# Patient Record
Sex: MALE | Race: WHITE | NOT HISPANIC OR LATINO | Employment: OTHER | URBAN - NONMETROPOLITAN AREA
[De-identification: names, ages, dates, MRNs, and addresses within clinical notes are randomized per-mention and may not be internally consistent; named-entity substitution may affect disease eponyms.]

---

## 2017-01-06 ENCOUNTER — TELEPHONE (OUTPATIENT)
Dept: UROLOGY | Facility: CLINIC | Age: 62
End: 2017-01-06

## 2017-01-06 NOTE — TELEPHONE ENCOUNTER
----- Message from Eleonora Chan sent at 1/6/2017  9:55 AM CST -----  Contact: [phone]  PATIENT WANTS TO KNOW RESULTS OF HIS LABS FROM December THANKS

## 2017-05-11 ENCOUNTER — OFFICE VISIT (OUTPATIENT)
Dept: CARDIOLOGY | Facility: CLINIC | Age: 62
End: 2017-05-11

## 2017-05-11 VITALS
BODY MASS INDEX: 41.77 KG/M2 | HEIGHT: 69 IN | RESPIRATION RATE: 18 BRPM | DIASTOLIC BLOOD PRESSURE: 80 MMHG | SYSTOLIC BLOOD PRESSURE: 135 MMHG | WEIGHT: 282 LBS | HEART RATE: 66 BPM

## 2017-05-11 DIAGNOSIS — I25.10 CORONARY ARTERY DISEASE INVOLVING NATIVE CORONARY ARTERY OF NATIVE HEART WITHOUT ANGINA PECTORIS: Primary | ICD-10-CM

## 2017-05-11 DIAGNOSIS — I10 ESSENTIAL HYPERTENSION: ICD-10-CM

## 2017-05-11 DIAGNOSIS — E78.2 MIXED HYPERLIPIDEMIA: ICD-10-CM

## 2017-05-11 PROCEDURE — 93000 ELECTROCARDIOGRAM COMPLETE: CPT | Performed by: PHYSICIAN ASSISTANT

## 2017-05-11 PROCEDURE — 99214 OFFICE O/P EST MOD 30 MIN: CPT | Performed by: PHYSICIAN ASSISTANT

## 2017-05-11 RX ORDER — ATORVASTATIN CALCIUM 20 MG/1
20 TABLET, FILM COATED ORAL NIGHTLY
Qty: 90 TABLET | Refills: 3 | Status: SHIPPED | OUTPATIENT
Start: 2017-05-11 | End: 2017-07-05 | Stop reason: SDUPTHER

## 2017-05-11 RX ORDER — FUROSEMIDE 20 MG/1
20 TABLET ORAL DAILY
Qty: 90 TABLET | Refills: 3 | Status: SHIPPED | OUTPATIENT
Start: 2017-05-11 | End: 2018-03-26 | Stop reason: SDUPTHER

## 2017-05-11 RX ORDER — POTASSIUM CHLORIDE 750 MG/1
10 TABLET, EXTENDED RELEASE ORAL DAILY
Qty: 90 TABLET | Refills: 3 | Status: SHIPPED | OUTPATIENT
Start: 2017-05-11 | End: 2018-03-26 | Stop reason: SDUPTHER

## 2017-05-11 RX ORDER — ASPIRIN 81 MG/1
81 TABLET ORAL DAILY
COMMUNITY

## 2017-05-11 RX ORDER — CARVEDILOL 25 MG/1
25 TABLET ORAL 2 TIMES DAILY WITH MEALS
COMMUNITY

## 2017-05-11 RX ORDER — AMLODIPINE BESYLATE 5 MG/1
5 TABLET ORAL DAILY
COMMUNITY

## 2017-05-11 RX ORDER — CHLORAL HYDRATE 500 MG
CAPSULE ORAL 2 TIMES DAILY WITH MEALS
COMMUNITY

## 2017-06-06 ENCOUNTER — HOSPITAL ENCOUNTER (OUTPATIENT)
Dept: GENERAL RADIOLOGY | Age: 62
Discharge: HOME | End: 2017-06-06
Payer: COMMERCIAL

## 2017-06-06 ENCOUNTER — HOSPITAL ENCOUNTER (OUTPATIENT)
Age: 62
Setting detail: OUTPATIENT SURGERY
Discharge: HOME | End: 2017-06-06
Attending: PHYSICAL MEDICINE & REHABILITATION | Admitting: PHYSICAL MEDICINE & REHABILITATION

## 2017-06-06 VITALS
OXYGEN SATURATION: 95 % | SYSTOLIC BLOOD PRESSURE: 123 MMHG | BODY MASS INDEX: 33.64 KG/M2 | HEIGHT: 70 IN | WEIGHT: 235 LBS | DIASTOLIC BLOOD PRESSURE: 74 MMHG | RESPIRATION RATE: 18 BRPM | HEART RATE: 49 BPM

## 2017-06-06 DIAGNOSIS — R52 PAIN: ICD-10-CM

## 2017-06-06 PROCEDURE — G8907 PT DOC NO EVENTS ON DISCHARG: HCPCS | Performed by: NURSE PRACTITIONER

## 2017-06-06 PROCEDURE — G8918 PT W/O PREOP ORDER IV AB PRO: HCPCS | Performed by: NURSE PRACTITIONER

## 2017-06-06 PROCEDURE — 62322 NJX INTERLAMINAR LMBR/SAC: CPT

## 2017-06-06 PROCEDURE — 3209999900 FLUORO FOR SURGICAL PROCEDURES

## 2017-06-06 RX ORDER — CHLORAL HYDRATE 500 MG
1000 CAPSULE ORAL 2 TIMES DAILY
COMMUNITY

## 2017-06-06 RX ORDER — M-VIT,TX,IRON,MINS/CALC/FOLIC 27MG-0.4MG
1 TABLET ORAL DAILY
COMMUNITY

## 2017-06-06 RX ORDER — METHYLPREDNISOLONE 4 MG/1
2 TABLET ORAL DAILY
COMMUNITY
End: 2018-02-27

## 2017-06-06 RX ORDER — CALCIUM POLYCARBOPHIL 625 MG 625 MG/1
625 TABLET ORAL DAILY
COMMUNITY
End: 2018-02-27

## 2017-06-06 RX ORDER — 0.9 % SODIUM CHLORIDE 0.9 %
VIAL (ML) INJECTION PRN
Status: DISCONTINUED | OUTPATIENT
Start: 2017-06-06 | End: 2017-06-06 | Stop reason: HOSPADM

## 2017-06-06 RX ORDER — AMOXICILLIN 250 MG
1 CAPSULE ORAL DAILY
COMMUNITY
End: 2018-02-27

## 2017-06-06 RX ORDER — CARVEDILOL 25 MG/1
25 TABLET ORAL 2 TIMES DAILY WITH MEALS
COMMUNITY

## 2017-06-06 RX ORDER — METHYLPREDNISOLONE ACETATE 80 MG/ML
INJECTION, SUSPENSION INTRA-ARTICULAR; INTRALESIONAL; INTRAMUSCULAR; SOFT TISSUE PRN
Status: DISCONTINUED | OUTPATIENT
Start: 2017-06-06 | End: 2017-06-06 | Stop reason: HOSPADM

## 2017-06-06 RX ORDER — ATORVASTATIN CALCIUM 20 MG/1
20 TABLET, FILM COATED ORAL NIGHTLY
COMMUNITY

## 2017-06-06 RX ORDER — TAMSULOSIN HYDROCHLORIDE 0.4 MG/1
0.4 CAPSULE ORAL NIGHTLY
COMMUNITY

## 2017-06-06 RX ORDER — AMLODIPINE BESYLATE 5 MG/1
5 TABLET ORAL DAILY
COMMUNITY

## 2017-06-06 RX ORDER — LISINOPRIL 20 MG/1
20 TABLET ORAL DAILY
COMMUNITY
End: 2018-02-27

## 2017-06-06 RX ORDER — DICLOFENAC POTASSIUM 50 MG/1
75 TABLET, FILM COATED ORAL 2 TIMES DAILY
Status: ON HOLD | COMMUNITY
End: 2018-03-14 | Stop reason: HOSPADM

## 2017-06-06 RX ORDER — LIDOCAINE HYDROCHLORIDE 10 MG/ML
INJECTION, SOLUTION INFILTRATION; PERINEURAL PRN
Status: DISCONTINUED | OUTPATIENT
Start: 2017-06-06 | End: 2017-06-06 | Stop reason: HOSPADM

## 2017-07-05 RX ORDER — ATORVASTATIN CALCIUM 20 MG/1
TABLET, FILM COATED ORAL
Qty: 90 TABLET | Refills: 3 | Status: SHIPPED | OUTPATIENT
Start: 2017-07-05 | End: 2018-03-26 | Stop reason: SDUPTHER

## 2017-09-05 ENCOUNTER — HOSPITAL ENCOUNTER (OUTPATIENT)
Age: 62
Setting detail: OUTPATIENT SURGERY
Discharge: HOME | End: 2017-09-05
Attending: PHYSICAL MEDICINE & REHABILITATION | Admitting: PHYSICAL MEDICINE & REHABILITATION

## 2017-09-05 ENCOUNTER — HOSPITAL ENCOUNTER (OUTPATIENT)
Dept: GENERAL RADIOLOGY | Age: 62
Discharge: HOME | End: 2017-09-05
Payer: COMMERCIAL

## 2017-09-05 VITALS
WEIGHT: 280 LBS | RESPIRATION RATE: 18 BRPM | HEIGHT: 70 IN | TEMPERATURE: 98 F | SYSTOLIC BLOOD PRESSURE: 130 MMHG | HEART RATE: 54 BPM | DIASTOLIC BLOOD PRESSURE: 68 MMHG | OXYGEN SATURATION: 96 % | BODY MASS INDEX: 40.09 KG/M2

## 2017-09-05 DIAGNOSIS — R52 PAIN: ICD-10-CM

## 2017-09-05 PROCEDURE — 3209999900 FLUORO FOR SURGICAL PROCEDURES

## 2017-09-05 PROCEDURE — G8918 PT W/O PREOP ORDER IV AB PRO: HCPCS | Performed by: NURSE PRACTITIONER

## 2017-09-05 PROCEDURE — G8907 PT DOC NO EVENTS ON DISCHARG: HCPCS | Performed by: NURSE PRACTITIONER

## 2017-09-05 PROCEDURE — 62323 NJX INTERLAMINAR LMBR/SAC: CPT

## 2017-09-05 RX ORDER — GABAPENTIN 300 MG/1
300 CAPSULE ORAL NIGHTLY
COMMUNITY
End: 2018-02-27

## 2017-09-05 RX ORDER — 0.9 % SODIUM CHLORIDE 0.9 %
VIAL (ML) INJECTION PRN
Status: DISCONTINUED | OUTPATIENT
Start: 2017-09-05 | End: 2017-09-05 | Stop reason: HOSPADM

## 2017-09-05 RX ORDER — METHYLPREDNISOLONE ACETATE 80 MG/ML
INJECTION, SUSPENSION INTRA-ARTICULAR; INTRALESIONAL; INTRAMUSCULAR; SOFT TISSUE PRN
Status: DISCONTINUED | OUTPATIENT
Start: 2017-09-05 | End: 2017-09-05 | Stop reason: HOSPADM

## 2017-09-05 RX ORDER — LIDOCAINE HYDROCHLORIDE 10 MG/ML
INJECTION, SOLUTION INFILTRATION; PERINEURAL PRN
Status: DISCONTINUED | OUTPATIENT
Start: 2017-09-05 | End: 2017-09-05 | Stop reason: HOSPADM

## 2017-12-01 DIAGNOSIS — N40.1 BPH WITH URINARY OBSTRUCTION: ICD-10-CM

## 2017-12-01 DIAGNOSIS — N13.8 BPH WITH URINARY OBSTRUCTION: ICD-10-CM

## 2017-12-01 DIAGNOSIS — R33.9 RETENTION OF URINE: Primary | ICD-10-CM

## 2017-12-06 ENCOUNTER — OFFICE VISIT (OUTPATIENT)
Dept: UROLOGY | Facility: CLINIC | Age: 62
End: 2017-12-06

## 2017-12-06 VITALS — TEMPERATURE: 98 F | BODY MASS INDEX: 38.25 KG/M2 | WEIGHT: 267.2 LBS | HEIGHT: 70 IN

## 2017-12-06 DIAGNOSIS — N52.01 ERECTILE DYSFUNCTION DUE TO ARTERIAL INSUFFICIENCY: ICD-10-CM

## 2017-12-06 DIAGNOSIS — N13.8 BPH WITH URINARY OBSTRUCTION: Primary | ICD-10-CM

## 2017-12-06 DIAGNOSIS — N40.1 BPH WITH URINARY OBSTRUCTION: Primary | ICD-10-CM

## 2017-12-06 DIAGNOSIS — R33.9 RETENTION OF URINE: ICD-10-CM

## 2017-12-06 LAB
BILIRUB BLD-MCNC: NEGATIVE MG/DL
CLARITY, POC: CLEAR
COLOR UR: YELLOW
GLUCOSE UR STRIP-MCNC: NEGATIVE MG/DL
KETONES UR QL: NEGATIVE
LEUKOCYTE EST, POC: NEGATIVE
NITRITE UR-MCNC: NEGATIVE MG/ML
PH UR: 5 [PH] (ref 5–8)
PROT UR STRIP-MCNC: NEGATIVE MG/DL
RBC # UR STRIP: ABNORMAL /UL
SP GR UR: 1.02 (ref 1–1.03)
UROBILINOGEN UR QL: NORMAL

## 2017-12-06 PROCEDURE — 81003 URINALYSIS AUTO W/O SCOPE: CPT | Performed by: UROLOGY

## 2017-12-06 PROCEDURE — 99213 OFFICE O/P EST LOW 20 MIN: CPT | Performed by: UROLOGY

## 2017-12-06 RX ORDER — TAMSULOSIN HYDROCHLORIDE 0.4 MG/1
1 CAPSULE ORAL NIGHTLY
Qty: 90 CAPSULE | Refills: 3 | Status: SHIPPED | OUTPATIENT
Start: 2017-12-06 | End: 2018-12-10 | Stop reason: SDUPTHER

## 2017-12-06 RX ORDER — METFORMIN HYDROCHLORIDE 500 MG/1
500 TABLET, EXTENDED RELEASE ORAL
COMMUNITY
End: 2021-03-08

## 2017-12-06 RX ORDER — MAGNESIUM GLUCONATE 27 MG(500)
500 TABLET ORAL 2 TIMES DAILY
COMMUNITY

## 2017-12-06 NOTE — PROGRESS NOTES
Subjective    Mr. Newman is 62 y.o. male    Chief Complaint: Urinary Retention/BPH    History of Present Illness       Urinary Retention  Patient complains of urinary retention. Onset of retention was 4 months ago and was sudden in onset. Patient currently does not have a urinary catheter in place. 1600 ml of urine were drained when catheter was placed. Prior to this event voiding symptoms consisted of slow stream. Prior treatments include watchful waiting. Recent medications that may have affected his voiding include none.      Benign Prostatic Hypertrophy  Patient complains of lower urinary tract symptoms. He reports frequency, incomplete emptying and weak stream. He denies nocturia one time a night and straining. Patient states symptoms are of mild severity. Onset of symptoms was 1 years ago and was sudden in onset. His AUA Symptom Score is, 10/35. He has no personal history of prostate cancer. He reports a history of no complicating symptoms. He denies flank pain, gross hematuria, kidney stones and recurrent UTI.      Erectile Dysfunction  Patient complains of erectile dysfunction. Onset of dysfunction was several years ago and was gradual in onset. Patient states the nature of difficulty is maintaining erection. Full erections occur never. Partial erections occur never. Libido is not affected. Risk factors for ED include cardiovascular disease. Patient denies history of cardiovascular disease. Previous treatment of ED includes none.  The following portions of the patient's history were reviewed and updated as appropriate: allergies, current medications, past family history, past medical history, past social history, past surgical history and problem list.    Review of Systems   Constitutional: Negative for chills and fever.   Gastrointestinal: Negative for abdominal pain, anal bleeding and blood in stool.   Genitourinary: Negative for flank pain and hematuria.         Current Outpatient Prescriptions:   •   amLODIPine (NORVASC) 10 MG tablet, Take 10 mg by mouth Daily., Disp: , Rfl:   •  aspirin 81 MG EC tablet, Take 81 mg by mouth Daily., Disp: , Rfl:   •  atorvastatin (LIPITOR) 20 MG tablet, TAKE 1 TABLET AT BEDTIME, Disp: 90 tablet, Rfl: 3  •  carvedilol (COREG) 25 MG tablet, Take 25 mg by mouth 2 (Two) Times a Day With Meals., Disp: , Rfl:   •  DICLOFENAC PO, Take 75 mg by mouth., Disp: , Rfl:   •  furosemide (LASIX) 20 MG tablet, Take 1 tablet by mouth Daily., Disp: 90 tablet, Rfl: 3  •  lisinopril-hydrochlorothiazide (PRINZIDE,ZESTORETIC) 20-25 MG per tablet, , Disp: , Rfl:   •  magnesium gluconate (MAGONATE) 500 MG tablet, Take 500 mg by mouth 2 (Two) Times a Day., Disp: , Rfl:   •  metFORMIN ER (GLUCOPHAGE-XR) 500 MG 24 hr tablet, Take 500 mg by mouth Daily With Breakfast., Disp: , Rfl:   •  Multiple Vitamin (MULTI-VITAMIN DAILY PO), Take  by mouth., Disp: , Rfl:   •  Omega-3 Fatty Acids (FISH OIL) 1000 MG capsule capsule, Take  by mouth 2 (Two) Times a Day With Meals., Disp: , Rfl:   •  potassium chloride (K-DUR,KLOR-CON) 10 MEQ CR tablet, Take 1 tablet by mouth Daily., Disp: 90 tablet, Rfl: 3  •  tamsulosin (FLOMAX) 0.4 MG capsule 24 hr capsule, Take 1 capsule by mouth every night., Disp: 30 capsule, Rfl: 11    Past Medical History:   Diagnosis Date   • CAD (coronary artery disease)    • Hyperchloremia    • Hyperlipidemia    • Hypertension    • Peripheral vascular disease    • Retention, urine    • S/P coronary artery stent placement     right side       Past Surgical History:   Procedure Laterality Date   • ANKLE ARTHROPLASTY     • FOOT SURGERY Left    • HERNIA REPAIR     • REPLACEMENT TOTAL KNEE         Social History     Social History   • Marital status:      Spouse name: N/A   • Number of children: N/A   • Years of education: N/A     Social History Main Topics   • Smoking status: Never Smoker   • Smokeless tobacco: Never Used   • Alcohol use No   • Drug use: No   • Sexual activity: Defer     Other  "Topics Concern   • None     Social History Narrative       Family History   Problem Relation Age of Onset   • Hypertension Father    • Cancer Father    • Cancer Mother    • Hypertension Mother    • Immunodeficiency Sister        Objective    Temp 98 °F (36.7 °C)  Ht 177.8 cm (70\")  Wt 121 kg (267 lb 3.2 oz)  BMI 38.34 kg/m2    Physical Exam   Constitutional: He is oriented to person, place, and time. He appears well-developed and well-nourished.   Pulmonary/Chest: Effort normal.   Abdominal: Soft. He exhibits no distension and no mass. There is no tenderness. There is no rebound and no guarding. No hernia.   Genitourinary: Penis normal. Rectal exam shows no mass, no tenderness and anal tone normal. Enlarged: for the age of the patient. Right testis shows no mass, no swelling and no tenderness. Left testis shows no mass, no swelling and no tenderness. No hypospadias. No discharge found.   Genitourinary Comments:  The urethral meatus normal in position without evidence of stricture. Epididymis without mass or tenderness. Vas Deferens is palpably normal.Anus and perineum without mass or tenderness. The prostate is approximately 50 ml. It is Symmetric, with a Soft consistency. There are no nodules present. . The seminal vesicles are Not palpable due to the size of the prostate.     Neurological: He is alert and oriented to person, place, and time.   Vitals reviewed.          Results for orders placed or performed in visit on 12/06/17   POC Urinalysis Dipstick, Automated   Result Value Ref Range    Color Yellow Yellow, Straw, Dark Yellow, Araceli    Clarity, UA Clear Clear    Glucose, UA Negative Negative, 1000 mg/dL (3+) mg/dL    Bilirubin Negative Negative    Ketones, UA Negative Negative    Specific Gravity  1.020 1.005 - 1.030    Blood, UA Trace (A) Negative    pH, Urine 5.0 5.0 - 8.0    Protein, POC Negative Negative mg/dL    Urobilinogen, UA Normal Normal    Leukocytes Negative Negative    Nitrite, UA Negative " Negative     Assessment and Plan    Diagnoses and all orders for this visit:    BPH with urinary obstruction  -     POC Urinalysis Dipstick, Automated  -     PSA; Future    Erectile dysfunction due to arterial insufficiency    Retention of urine      PSA stable at 2.2.  His voiding symptoms are stable we will continue Flomax.  Follow-up in 1 year.

## 2018-02-26 ENCOUNTER — OFFICE VISIT (OUTPATIENT)
Dept: CARDIOLOGY | Facility: CLINIC | Age: 63
End: 2018-02-26

## 2018-02-26 VITALS
HEART RATE: 51 BPM | WEIGHT: 255 LBS | SYSTOLIC BLOOD PRESSURE: 120 MMHG | DIASTOLIC BLOOD PRESSURE: 70 MMHG | HEIGHT: 69 IN | BODY MASS INDEX: 37.77 KG/M2 | OXYGEN SATURATION: 97 %

## 2018-02-26 DIAGNOSIS — E78.2 MIXED HYPERLIPIDEMIA: ICD-10-CM

## 2018-02-26 DIAGNOSIS — I25.10 CORONARY ARTERY DISEASE INVOLVING NATIVE CORONARY ARTERY OF NATIVE HEART WITHOUT ANGINA PECTORIS: Primary | ICD-10-CM

## 2018-02-26 DIAGNOSIS — I10 ESSENTIAL HYPERTENSION: ICD-10-CM

## 2018-02-26 PROCEDURE — 93000 ELECTROCARDIOGRAM COMPLETE: CPT | Performed by: INTERNAL MEDICINE

## 2018-02-26 PROCEDURE — 99214 OFFICE O/P EST MOD 30 MIN: CPT | Performed by: INTERNAL MEDICINE

## 2018-02-26 RX ORDER — MELATONIN
1000 DAILY
COMMUNITY
End: 2021-03-08

## 2018-02-26 NOTE — PROGRESS NOTES
Referring Provider: Yobani Aguillon MD    Reason for Follow-up Visit: CAD    Subjective .   Chief Complaint:   Chief Complaint   Patient presents with   • Follow-up     yearly  for CAD, HTN, HDL and needs surgical clearance.   • Coronary Artery Disease     pt states he has felt much better since he has lost 30 pounds.  no symptoms.   • Hypertension     runs good at home.   • Hyperlipidemia     having lab tomorrow at [facility].   • Surgical Clearance     needs clearance to have back surgery.       History of present illness:  Caron Newman is a 62 y.o. yo male with history of CAD, s/p stent placement in 2014. Denies any chest pain but needs to have back surgery.        History  Past Medical History:   Diagnosis Date   • CAD (coronary artery disease)    • Cancer     skin   • Hyperchloremia    • Hyperlipidemia    • Hypertension    • Myocardial infarction    • Peripheral vascular disease    • Retention, urine    • S/P coronary artery stent placement     right side   • Sleep apnea    ,   Past Surgical History:   Procedure Laterality Date   • ANKLE ARTHROPLASTY     • CARDIAC CATHETERIZATION     • CORONARY STENT PLACEMENT     • FOOT SURGERY Left    • HERNIA REPAIR     • REPLACEMENT TOTAL KNEE     ,   Family History   Problem Relation Age of Onset   • Hypertension Father    • Cancer Father    • Cancer Mother    • Hypertension Mother    • Immunodeficiency Sister    • Hypertension Brother    ,   Social History   Substance Use Topics   • Smoking status: Never Smoker   • Smokeless tobacco: Never Used   • Alcohol use No   ,     Medications  Current Outpatient Prescriptions   Medication Sig Dispense Refill   • 5-HYDROXYTRYPTOPHAN PO Take  by mouth.     • amLODIPine (NORVASC) 10 MG tablet Take 10 mg by mouth Daily.     • aspirin 81 MG EC tablet Take 81 mg by mouth Daily.     • atorvastatin (LIPITOR) 20 MG tablet TAKE 1 TABLET AT BEDTIME 90 tablet 3   • carvedilol (COREG) 25 MG tablet Take 25 mg by mouth 2 (Two) Times a Day With  "Meals.     • cholecalciferol (VITAMIN D3) 1000 units tablet Take 1,000 Units by mouth Daily.     • CHROMIUM PO Take  by mouth.     • DICLOFENAC PO Take 75 mg by mouth.     • furosemide (LASIX) 20 MG tablet Take 1 tablet by mouth Daily. 90 tablet 3   • IODINE-VITAMIN A PO Take  by mouth.     • lisinopril-hydrochlorothiazide (PRINZIDE,ZESTORETIC) 20-25 MG per tablet      • magnesium gluconate (MAGONATE) 500 MG tablet Take 500 mg by mouth 2 (Two) Times a Day.     • metFORMIN ER (GLUCOPHAGE-XR) 500 MG 24 hr tablet Take 500 mg by mouth Daily With Breakfast.     • Multiple Vitamin (MULTI-VITAMIN DAILY PO) Take  by mouth.     • Omega-3 Fatty Acids (FISH OIL) 1000 MG capsule capsule Take  by mouth 2 (Two) Times a Day With Meals.     • potassium chloride (K-DUR,KLOR-CON) 10 MEQ CR tablet Take 1 tablet by mouth Daily. 90 tablet 3   • tamsulosin (FLOMAX) 0.4 MG capsule 24 hr capsule Take 1 capsule by mouth Every Night. 90 capsule 3     No current facility-administered medications for this visit.        Allergies:  Ciprofloxacin    Review of Systems  Review of Systems   HENT: Negative for nosebleeds.    Cardiovascular: Negative for chest pain, claudication, dyspnea on exertion, irregular heartbeat, leg swelling, near-syncope, orthopnea, palpitations, paroxysmal nocturnal dyspnea and syncope.   Respiratory: Negative for cough, hemoptysis and shortness of breath.    Musculoskeletal: Positive for back pain.   Gastrointestinal: Negative for dysphagia, hematemesis and melena.   Genitourinary: Negative for hematuria.   All other systems reviewed and are negative.      Objective     Physical Exam:  /70 (BP Location: Left arm, Patient Position: Sitting, Cuff Size: Adult)  Pulse 51  Ht 175.3 cm (69\")  Wt 116 kg (255 lb)  SpO2 97%  BMI 37.66 kg/m2  Physical Exam   Constitutional: He is oriented to person, place, and time. He appears well-nourished. No distress.   HENT:   Head: Normocephalic.   Eyes: No scleral icterus.   Neck: " Normal range of motion. Neck supple.   Cardiovascular: Normal rate, regular rhythm and normal heart sounds.  Exam reveals no gallop and no friction rub.    No murmur heard.  Pulmonary/Chest: Effort normal and breath sounds normal. No respiratory distress. He has no wheezes. He has no rales.   Abdominal: Soft. Bowel sounds are normal. He exhibits no distension. There is no tenderness.   Musculoskeletal: He exhibits no edema.   Neurological: He is alert and oriented to person, place, and time.   Skin: Skin is warm and dry. He is not diaphoretic. No erythema.   Psychiatric: He has a normal mood and affect. His behavior is normal.       Results Review:    ECG 12 Lead  Date/Time: 2/26/2018 10:27 AM  Performed by: JASMYNE AVILES  Authorized by: JASMYNE AVILES   Comparison: compared with previous ECG   Similar to previous ECG  Rhythm: sinus rhythm  Rate: normal  Conduction: conduction normal  ST Segments: ST segments normal  T Waves: T waves normal  QRS axis: normal  Clinical impression: normal ECG            Office Visit on 12/06/2017   Component Date Value Ref Range Status   • Color 12/06/2017 Yellow  Yellow, Straw, Dark Yellow, Araceli Final   • Clarity, UA 12/06/2017 Clear  Clear Final   • Glucose, UA 12/06/2017 Negative  Negative, 1000 mg/dL (3+) mg/dL Final   • Bilirubin 12/06/2017 Negative  Negative Final   • Ketones, UA 12/06/2017 Negative  Negative Final   • Specific Gravity  12/06/2017 1.020  1.005 - 1.030 Final   • Blood, UA 12/06/2017 Trace* Negative Final   • pH, Urine 12/06/2017 5.0  5.0 - 8.0 Final   • Protein, POC 12/06/2017 Negative  Negative mg/dL Final   • Urobilinogen, UA 12/06/2017 Normal  Normal Final   • Leukocytes 12/06/2017 Negative  Negative Final   • Nitrite, UA 12/06/2017 Negative  Negative Final       Assessment/Plan   Caron was seen today for follow-up, coronary artery disease, hypertension, hyperlipidemia and surgical clearance.    Diagnoses and all orders for this visit:    Coronary artery  disease involving native coronary artery of native heart without angina pectoris, The patient denies chest pain, shortness of breath, dyspnea on exertion, orthopnea, PND, edema. There is no evidence of ongoing ischemia. Will need to have stress testing for preop clearance    Essential hypertension, good control    Mixed hyperlipidemia, Patient's statin therapy is followed by PCP.

## 2018-02-27 ENCOUNTER — HOSPITAL ENCOUNTER (OUTPATIENT)
Dept: PREADMISSION TESTING | Age: 63
Discharge: HOME | End: 2018-03-03
Payer: COMMERCIAL

## 2018-02-27 ENCOUNTER — HOSPITAL ENCOUNTER (OUTPATIENT)
Dept: GENERAL RADIOLOGY | Age: 63
Discharge: HOME | End: 2018-02-27
Payer: COMMERCIAL

## 2018-02-27 VITALS — HEIGHT: 69 IN | BODY MASS INDEX: 37.03 KG/M2 | WEIGHT: 250 LBS

## 2018-02-27 LAB
ALBUMIN SERPL-MCNC: 4.3 G/DL (ref 3.5–5.2)
ALP BLD-CCNC: 72 U/L (ref 40–130)
ALT SERPL-CCNC: 26 U/L (ref 5–41)
ANION GAP SERPL CALCULATED.3IONS-SCNC: 15 MMOL/L (ref 7–19)
APTT: 29.8 SEC (ref 26–36.2)
AST SERPL-CCNC: 27 U/L (ref 5–40)
BASOPHILS ABSOLUTE: 0 K/UL (ref 0–0.2)
BASOPHILS RELATIVE PERCENT: 0.5 % (ref 0–1)
BILIRUB SERPL-MCNC: 0.5 MG/DL (ref 0.2–1.2)
BILIRUBIN URINE: NEGATIVE
BLOOD, URINE: NEGATIVE
BUN BLDV-MCNC: 35 MG/DL (ref 8–23)
CALCIUM SERPL-MCNC: 9.1 MG/DL (ref 8.8–10.2)
CHLORIDE BLD-SCNC: 98 MMOL/L (ref 98–111)
CLARITY: CLEAR
CO2: 29 MMOL/L (ref 22–29)
COLOR: YELLOW
CREAT SERPL-MCNC: 1.2 MG/DL (ref 0.5–1.2)
EOSINOPHILS ABSOLUTE: 0.1 K/UL (ref 0–0.6)
EOSINOPHILS RELATIVE PERCENT: 2.5 % (ref 0–5)
GFR NON-AFRICAN AMERICAN: >60
GLUCOSE BLD-MCNC: 97 MG/DL (ref 74–109)
GLUCOSE URINE: NEGATIVE MG/DL
HCT VFR BLD CALC: 43.2 % (ref 42–52)
HEMOGLOBIN: 14.3 G/DL (ref 14–18)
INR BLD: 1.12 (ref 0.88–1.18)
KETONES, URINE: NEGATIVE MG/DL
LEUKOCYTE ESTERASE, URINE: NEGATIVE
LYMPHOCYTES ABSOLUTE: 1 K/UL (ref 1.1–4.5)
LYMPHOCYTES RELATIVE PERCENT: 22 % (ref 20–40)
MCH RBC QN AUTO: 30.6 PG (ref 27–31)
MCHC RBC AUTO-ENTMCNC: 33.1 G/DL (ref 33–37)
MCV RBC AUTO: 92.3 FL (ref 80–94)
MONOCYTES ABSOLUTE: 0.5 K/UL (ref 0–0.9)
MONOCYTES RELATIVE PERCENT: 12.5 % (ref 0–10)
NEUTROPHILS ABSOLUTE: 2.7 K/UL (ref 1.5–7.5)
NEUTROPHILS RELATIVE PERCENT: 62 % (ref 50–65)
NITRITE, URINE: NEGATIVE
PDW BLD-RTO: 13.1 % (ref 11.5–14.5)
PH UA: 5.5
PLATELET # BLD: 181 K/UL (ref 130–400)
PMV BLD AUTO: 9.4 FL (ref 9.4–12.4)
POTASSIUM SERPL-SCNC: 3.9 MMOL/L (ref 3.5–5)
PROTEIN UA: NEGATIVE MG/DL
PROTHROMBIN TIME: 14.3 SEC (ref 12–14.6)
RBC # BLD: 4.68 M/UL (ref 4.7–6.1)
SODIUM BLD-SCNC: 142 MMOL/L (ref 136–145)
SPECIFIC GRAVITY UA: 1.01
TOTAL PROTEIN: 7.2 G/DL (ref 6.6–8.7)
URINE REFLEX TO CULTURE: NORMAL
UROBILINOGEN, URINE: 0.2 E.U./DL
WBC # BLD: 4.3 K/UL (ref 4.8–10.8)

## 2018-02-27 PROCEDURE — 85610 PROTHROMBIN TIME: CPT

## 2018-02-27 PROCEDURE — 71046 X-RAY EXAM CHEST 2 VIEWS: CPT

## 2018-02-27 PROCEDURE — 80053 COMPREHEN METABOLIC PANEL: CPT

## 2018-02-27 PROCEDURE — 85025 COMPLETE CBC W/AUTO DIFF WBC: CPT

## 2018-02-27 PROCEDURE — 85730 THROMBOPLASTIN TIME PARTIAL: CPT

## 2018-02-27 RX ORDER — LISINOPRIL AND HYDROCHLOROTHIAZIDE 25; 20 MG/1; MG/1
1 TABLET ORAL DAILY
COMMUNITY

## 2018-02-27 RX ORDER — FUROSEMIDE 20 MG/1
20 TABLET ORAL NIGHTLY
COMMUNITY

## 2018-02-27 RX ORDER — BIOTIN 1 MG
TABLET ORAL 2 TIMES DAILY
COMMUNITY

## 2018-02-27 RX ORDER — TRAMADOL HYDROCHLORIDE 50 MG/1
50 TABLET ORAL EVERY 6 HOURS PRN
COMMUNITY

## 2018-02-27 RX ORDER — DIPHENHYDRAMINE HCL 25 MG
25 TABLET ORAL NIGHTLY PRN
COMMUNITY

## 2018-02-27 RX ORDER — MAGNESIUM GLUCONATE 27 MG(500)
1000 TABLET ORAL NIGHTLY
COMMUNITY

## 2018-02-27 RX ORDER — POTASSIUM CITRATE 10 MEQ/1
10 TABLET, EXTENDED RELEASE ORAL DAILY
COMMUNITY

## 2018-02-27 RX ORDER — ACETAMINOPHEN 500 MG
1000 TABLET ORAL NIGHTLY
COMMUNITY

## 2018-03-08 ENCOUNTER — HOSPITAL ENCOUNTER (OUTPATIENT)
Dept: CARDIOLOGY | Facility: HOSPITAL | Age: 63
Discharge: HOME | End: 2018-03-08
Attending: INTERNAL MEDICINE

## 2018-03-08 ENCOUNTER — TELEPHONE (OUTPATIENT)
Dept: CARDIOLOGY | Facility: CLINIC | Age: 63
End: 2018-03-08

## 2018-03-08 VITALS
DIASTOLIC BLOOD PRESSURE: 68 MMHG | SYSTOLIC BLOOD PRESSURE: 113 MMHG | HEART RATE: 46 BPM | WEIGHT: 250 LBS | BODY MASS INDEX: 37.03 KG/M2 | HEIGHT: 69 IN

## 2018-03-08 DIAGNOSIS — I25.10 CORONARY ARTERY DISEASE INVOLVING NATIVE CORONARY ARTERY OF NATIVE HEART WITHOUT ANGINA PECTORIS: ICD-10-CM

## 2018-03-08 PROCEDURE — 0 TECHNETIUM SESTAMIBI: Performed by: INTERNAL MEDICINE

## 2018-03-08 PROCEDURE — 78452 HT MUSCLE IMAGE SPECT MULT: CPT | Performed by: INTERNAL MEDICINE

## 2018-03-08 PROCEDURE — 93018 CV STRESS TEST I&R ONLY: CPT | Performed by: INTERNAL MEDICINE

## 2018-03-08 PROCEDURE — 25010000002 REGADENOSON 0.4 MG/5ML SOLUTION: Performed by: INTERNAL MEDICINE

## 2018-03-08 PROCEDURE — A9500 TC99M SESTAMIBI: HCPCS | Performed by: INTERNAL MEDICINE

## 2018-03-08 PROCEDURE — 93017 CV STRESS TEST TRACING ONLY: CPT

## 2018-03-08 PROCEDURE — 78452 HT MUSCLE IMAGE SPECT MULT: CPT

## 2018-03-08 RX ADMIN — TECHNETIUM TC 99M SESTAMIBI 1 DOSE: 1 INJECTION INTRAVENOUS at 09:50

## 2018-03-08 RX ADMIN — TECHNETIUM TC 99M SESTAMIBI 1 DOSE: 1 INJECTION INTRAVENOUS at 09:00

## 2018-03-08 RX ADMIN — REGADENOSON 0.4 MG: 0.08 INJECTION, SOLUTION INTRAVENOUS at 09:31

## 2018-03-08 NOTE — TELEPHONE ENCOUNTER
Delaney from Dr. Owens's office called asking if pt is cleared for surgery.  Pt had the stress test today.3/8/18.  Please advise.  Bassam Dickson, CMA

## 2018-03-09 ENCOUNTER — TELEPHONE (OUTPATIENT)
Dept: CARDIOLOGY | Facility: CLINIC | Age: 63
End: 2018-03-09

## 2018-03-09 LAB
BH CV STRESS BP STAGE 1: NORMAL
BH CV STRESS COMMENTS STAGE 1: NORMAL
BH CV STRESS DOSE REGADENOSON STAGE 1: 0.4
BH CV STRESS DURATION MIN STAGE 1: 0
BH CV STRESS DURATION SEC STAGE 1: 10
BH CV STRESS HR STAGE 1: 61
BH CV STRESS PROTOCOL 1: NORMAL
BH CV STRESS RECOVERY BP: NORMAL MMHG
BH CV STRESS RECOVERY HR: 55 BPM
BH CV STRESS STAGE 1: 1
LV EF NUC BP: 75 %
MAXIMAL PREDICTED HEART RATE: 156 BPM
PERCENT MAX PREDICTED HR: 39.1 %
STRESS BASELINE BP: NORMAL MMHG
STRESS BASELINE HR: 46 BPM
STRESS PERCENT HR: 46 %
STRESS POST EXERCISE DUR SEC: 10 SEC
STRESS POST PEAK BP: NORMAL MMHG
STRESS POST PEAK HR: 61 BPM
STRESS TARGET HR: 133 BPM

## 2018-03-09 NOTE — TELEPHONE ENCOUNTER
Per Dr. Helton, patient has small area of ischemia.  If he is asymptomatic, ok to proceed with surgery.

## 2018-03-13 ENCOUNTER — APPOINTMENT (OUTPATIENT)
Dept: GENERAL RADIOLOGY | Age: 63
DRG: 455 | End: 2018-03-13
Payer: COMMERCIAL

## 2018-03-13 ENCOUNTER — ANESTHESIA EVENT (OUTPATIENT)
Dept: OPERATING ROOM | Age: 63
DRG: 455 | End: 2018-03-13
Payer: COMMERCIAL

## 2018-03-13 ENCOUNTER — HOSPITAL ENCOUNTER (INPATIENT)
Age: 63
LOS: 1 days | Discharge: HOME | DRG: 455 | End: 2018-03-14
Payer: COMMERCIAL

## 2018-03-13 ENCOUNTER — ANESTHESIA (OUTPATIENT)
Dept: OPERATING ROOM | Age: 63
DRG: 455 | End: 2018-03-13
Payer: COMMERCIAL

## 2018-03-13 VITALS
SYSTOLIC BLOOD PRESSURE: 122 MMHG | OXYGEN SATURATION: 100 % | RESPIRATION RATE: 15 BRPM | TEMPERATURE: 97.3 F | DIASTOLIC BLOOD PRESSURE: 61 MMHG

## 2018-03-13 PROBLEM — G89.29 CHRONIC BILATERAL LOW BACK PAIN WITHOUT SCIATICA: Status: ACTIVE | Noted: 2018-03-13

## 2018-03-13 PROBLEM — M51.36 DDD (DEGENERATIVE DISC DISEASE), LUMBAR: Status: ACTIVE | Noted: 2018-03-13

## 2018-03-13 PROBLEM — M43.16 SPONDYLOLISTHESIS AT L4-L5 LEVEL: Status: ACTIVE | Noted: 2018-03-13

## 2018-03-13 PROBLEM — M51.369 DDD (DEGENERATIVE DISC DISEASE), LUMBAR: Status: ACTIVE | Noted: 2018-03-13

## 2018-03-13 PROBLEM — M54.50 CHRONIC BILATERAL LOW BACK PAIN WITHOUT SCIATICA: Status: ACTIVE | Noted: 2018-03-13

## 2018-03-13 LAB
ABO/RH: NORMAL
ANTIBODY SCREEN: NORMAL

## 2018-03-13 PROCEDURE — 3209999900 FLUORO FOR SURGICAL PROCEDURES

## 2018-03-13 PROCEDURE — 6370000000 HC RX 637 (ALT 250 FOR IP): Performed by: PHYSICIAN ASSISTANT

## 2018-03-13 PROCEDURE — 94762 N-INVAS EAR/PLS OXIMTRY CONT: CPT

## 2018-03-13 PROCEDURE — 2580000003 HC RX 258

## 2018-03-13 PROCEDURE — 86901 BLOOD TYPING SEROLOGIC RH(D): CPT

## 2018-03-13 PROCEDURE — 1210000000 HC MED SURG R&B

## 2018-03-13 PROCEDURE — 07DR3ZZ EXTRACTION OF ILIAC BONE MARROW, PERCUTANEOUS APPROACH: ICD-10-PCS

## 2018-03-13 PROCEDURE — 0SG00A0 FUSION OF LUMBAR VERTEBRAL JOINT WITH INTERBODY FUSION DEVICE, ANTERIOR APPROACH, ANTERIOR COLUMN, OPEN APPROACH: ICD-10-PCS

## 2018-03-13 PROCEDURE — 7100000000 HC PACU RECOVERY - FIRST 15 MIN

## 2018-03-13 PROCEDURE — G8979 MOBILITY GOAL STATUS: HCPCS

## 2018-03-13 PROCEDURE — 72100 X-RAY EXAM L-S SPINE 2/3 VWS: CPT

## 2018-03-13 PROCEDURE — 2720000010 HC SURG SUPPLY STERILE

## 2018-03-13 PROCEDURE — 2580000003 HC RX 258: Performed by: NURSE ANESTHETIST, CERTIFIED REGISTERED

## 2018-03-13 PROCEDURE — 2500000003 HC RX 250 WO HCPCS

## 2018-03-13 PROCEDURE — 0SG0071 FUSION OF LUMBAR VERTEBRAL JOINT WITH AUTOLOGOUS TISSUE SUBSTITUTE, POSTERIOR APPROACH, POSTERIOR COLUMN, OPEN APPROACH: ICD-10-PCS

## 2018-03-13 PROCEDURE — 86850 RBC ANTIBODY SCREEN: CPT

## 2018-03-13 PROCEDURE — 2580000003 HC RX 258: Performed by: PHYSICIAN ASSISTANT

## 2018-03-13 PROCEDURE — C1713 ANCHOR/SCREW BN/BN,TIS/BN: HCPCS

## 2018-03-13 PROCEDURE — G8978 MOBILITY CURRENT STATUS: HCPCS

## 2018-03-13 PROCEDURE — C1762 CONN TISS, HUMAN(INC FASCIA): HCPCS

## 2018-03-13 PROCEDURE — 6360000002 HC RX W HCPCS: Performed by: PHYSICIAN ASSISTANT

## 2018-03-13 PROCEDURE — 97165 OT EVAL LOW COMPLEX 30 MIN: CPT

## 2018-03-13 PROCEDURE — 97162 PT EVAL MOD COMPLEX 30 MIN: CPT

## 2018-03-13 PROCEDURE — 2720000002 HC MISC SURG SUPPLY STERILE >$500

## 2018-03-13 PROCEDURE — 86900 BLOOD TYPING SEROLOGIC ABO: CPT

## 2018-03-13 PROCEDURE — 3600000005 HC SURGERY LEVEL 5 BASE

## 2018-03-13 PROCEDURE — G8987 SELF CARE CURRENT STATUS: HCPCS

## 2018-03-13 PROCEDURE — 2720000001 HC MISC SURG SUPPLY STERILE $51-500

## 2018-03-13 PROCEDURE — 2700000000 HC OXYGEN THERAPY PER DAY

## 2018-03-13 PROCEDURE — 6360000002 HC RX W HCPCS

## 2018-03-13 PROCEDURE — 7100000001 HC PACU RECOVERY - ADDTL 15 MIN

## 2018-03-13 PROCEDURE — 3700000001 HC ADD 15 MINUTES (ANESTHESIA)

## 2018-03-13 PROCEDURE — C1776 JOINT DEVICE (IMPLANTABLE): HCPCS

## 2018-03-13 PROCEDURE — 2780000010 HC IMPLANT OTHER

## 2018-03-13 PROCEDURE — G8988 SELF CARE GOAL STATUS: HCPCS

## 2018-03-13 PROCEDURE — 3700000000 HC ANESTHESIA ATTENDED CARE

## 2018-03-13 PROCEDURE — 2500000003 HC RX 250 WO HCPCS: Performed by: NURSE ANESTHETIST, CERTIFIED REGISTERED

## 2018-03-13 PROCEDURE — 94664 DEMO&/EVAL PT USE INHALER: CPT

## 2018-03-13 PROCEDURE — 3600000015 HC SURGERY LEVEL 5 ADDTL 15MIN

## 2018-03-13 PROCEDURE — 36415 COLL VENOUS BLD VENIPUNCTURE: CPT

## 2018-03-13 PROCEDURE — 6360000002 HC RX W HCPCS: Performed by: NURSE ANESTHETIST, CERTIFIED REGISTERED

## 2018-03-13 DEVICE — AGENT HEMSTAT 3GM OXIDIZED REGENERATED CELOS ABSRB FOR CONT (ORDER MULTIPLES OF 5EA): Type: IMPLANTABLE DEVICE | Site: BACK | Status: FUNCTIONAL

## 2018-03-13 DEVICE — IMPLANTABLE DEVICE: Type: IMPLANTABLE DEVICE | Site: BACK | Status: FUNCTIONAL

## 2018-03-13 DEVICE — DEMINERALIZED BONE MATRIX (DBM) IN A LIPID CARRIER
Type: IMPLANTABLE DEVICE | Site: BACK | Status: FUNCTIONAL
Brand: STAGRAFT DBM PUTTY

## 2018-03-13 DEVICE — 5500 SERIES SET SCREW
Type: IMPLANTABLE DEVICE | Site: BACK | Status: FUNCTIONAL
Brand: TIGER SPINE SYSTEM

## 2018-03-13 DEVICE — 5500 SERIES CANNULATED SCREW  - 6.5MM X 50MM
Type: IMPLANTABLE DEVICE | Site: BACK | Status: FUNCTIONAL
Brand: TIGER SPINE SYSTEM

## 2018-03-13 DEVICE — 5506 SERIES PRE-BENT ROD MIS  - 5.5MM X 40MM
Type: IMPLANTABLE DEVICE | Site: BACK | Status: FUNCTIONAL
Brand: TIGER SPINE SYSTEM

## 2018-03-13 DEVICE — K WIRE FIX CHAMFERED FOR LAT FUS SYS TIMBERLINE: Type: IMPLANTABLE DEVICE | Site: BACK | Status: FUNCTIONAL

## 2018-03-13 RX ORDER — PROMETHAZINE HYDROCHLORIDE 25 MG/ML
6.25 INJECTION, SOLUTION INTRAMUSCULAR; INTRAVENOUS
Status: DISCONTINUED | OUTPATIENT
Start: 2018-03-13 | End: 2018-03-13 | Stop reason: HOSPADM

## 2018-03-13 RX ORDER — SODIUM CHLORIDE 0.9 % (FLUSH) 0.9 %
10 SYRINGE (ML) INJECTION PRN
Status: DISCONTINUED | OUTPATIENT
Start: 2018-03-13 | End: 2018-03-13 | Stop reason: HOSPADM

## 2018-03-13 RX ORDER — PROPOFOL 10 MG/ML
INJECTION, EMULSION INTRAVENOUS CONTINUOUS PRN
Status: DISCONTINUED | OUTPATIENT
Start: 2018-03-13 | End: 2018-03-13 | Stop reason: SDUPTHER

## 2018-03-13 RX ORDER — DEXAMETHASONE SODIUM PHOSPHATE 4 MG/ML
INJECTION, SOLUTION INTRA-ARTICULAR; INTRALESIONAL; INTRAMUSCULAR; INTRAVENOUS; SOFT TISSUE PRN
Status: DISCONTINUED | OUTPATIENT
Start: 2018-03-13 | End: 2018-03-13 | Stop reason: HOSPADM

## 2018-03-13 RX ORDER — ONDANSETRON 2 MG/ML
INJECTION INTRAMUSCULAR; INTRAVENOUS PRN
Status: DISCONTINUED | OUTPATIENT
Start: 2018-03-13 | End: 2018-03-13 | Stop reason: SDUPTHER

## 2018-03-13 RX ORDER — METFORMIN HYDROCHLORIDE 500 MG/1
500 TABLET, EXTENDED RELEASE ORAL
COMMUNITY

## 2018-03-13 RX ORDER — ONDANSETRON 2 MG/ML
4 INJECTION INTRAMUSCULAR; INTRAVENOUS EVERY 6 HOURS PRN
Status: DISCONTINUED | OUTPATIENT
Start: 2018-03-13 | End: 2018-03-14 | Stop reason: HOSPADM

## 2018-03-13 RX ORDER — MORPHINE SULFATE 4 MG/ML
1 INJECTION, SOLUTION INTRAMUSCULAR; INTRAVENOUS EVERY 5 MIN PRN
Status: DISCONTINUED | OUTPATIENT
Start: 2018-03-13 | End: 2018-03-13 | Stop reason: HOSPADM

## 2018-03-13 RX ORDER — HYDROCHLOROTHIAZIDE 25 MG/1
25 TABLET ORAL DAILY
Status: DISCONTINUED | OUTPATIENT
Start: 2018-03-13 | End: 2018-03-14 | Stop reason: HOSPADM

## 2018-03-13 RX ORDER — ONDANSETRON 2 MG/ML
4 INJECTION INTRAMUSCULAR; INTRAVENOUS
Status: DISCONTINUED | OUTPATIENT
Start: 2018-03-13 | End: 2018-03-13 | Stop reason: HOSPADM

## 2018-03-13 RX ORDER — MIDAZOLAM HYDROCHLORIDE 1 MG/ML
INJECTION INTRAMUSCULAR; INTRAVENOUS PRN
Status: DISCONTINUED | OUTPATIENT
Start: 2018-03-13 | End: 2018-03-13 | Stop reason: SDUPTHER

## 2018-03-13 RX ORDER — AMLODIPINE BESYLATE 10 MG/1
10 TABLET ORAL DAILY
Status: DISCONTINUED | OUTPATIENT
Start: 2018-03-14 | End: 2018-03-14 | Stop reason: HOSPADM

## 2018-03-13 RX ORDER — HYDRALAZINE HYDROCHLORIDE 20 MG/ML
5 INJECTION INTRAMUSCULAR; INTRAVENOUS EVERY 10 MIN PRN
Status: DISCONTINUED | OUTPATIENT
Start: 2018-03-13 | End: 2018-03-13 | Stop reason: HOSPADM

## 2018-03-13 RX ORDER — OXYCODONE HYDROCHLORIDE AND ACETAMINOPHEN 5; 325 MG/1; MG/1
2 TABLET ORAL EVERY 4 HOURS PRN
Status: DISCONTINUED | OUTPATIENT
Start: 2018-03-13 | End: 2018-03-14 | Stop reason: HOSPADM

## 2018-03-13 RX ORDER — FENTANYL CITRATE 50 UG/ML
50 INJECTION, SOLUTION INTRAMUSCULAR; INTRAVENOUS
Status: DISCONTINUED | OUTPATIENT
Start: 2018-03-13 | End: 2018-03-13 | Stop reason: HOSPADM

## 2018-03-13 RX ORDER — MORPHINE SULFATE 4 MG/ML
2 INJECTION, SOLUTION INTRAMUSCULAR; INTRAVENOUS EVERY 5 MIN PRN
Status: DISCONTINUED | OUTPATIENT
Start: 2018-03-13 | End: 2018-03-13 | Stop reason: HOSPADM

## 2018-03-13 RX ORDER — LIDOCAINE HYDROCHLORIDE 10 MG/ML
1 INJECTION, SOLUTION EPIDURAL; INFILTRATION; INTRACAUDAL; PERINEURAL
Status: DISCONTINUED | OUTPATIENT
Start: 2018-03-13 | End: 2018-03-13 | Stop reason: HOSPADM

## 2018-03-13 RX ORDER — TAMSULOSIN HYDROCHLORIDE 0.4 MG/1
0.4 CAPSULE ORAL NIGHTLY
Status: DISCONTINUED | OUTPATIENT
Start: 2018-03-13 | End: 2018-03-14 | Stop reason: HOSPADM

## 2018-03-13 RX ORDER — FENTANYL CITRATE 50 UG/ML
INJECTION, SOLUTION INTRAMUSCULAR; INTRAVENOUS PRN
Status: DISCONTINUED | OUTPATIENT
Start: 2018-03-13 | End: 2018-03-13 | Stop reason: SDUPTHER

## 2018-03-13 RX ORDER — LISINOPRIL 20 MG/1
20 TABLET ORAL DAILY
Status: DISCONTINUED | OUTPATIENT
Start: 2018-03-13 | End: 2018-03-14 | Stop reason: HOSPADM

## 2018-03-13 RX ORDER — SODIUM CHLORIDE 0.9 % (FLUSH) 0.9 %
10 SYRINGE (ML) INJECTION EVERY 12 HOURS SCHEDULED
Status: DISCONTINUED | OUTPATIENT
Start: 2018-03-13 | End: 2018-03-14 | Stop reason: HOSPADM

## 2018-03-13 RX ORDER — FUROSEMIDE 20 MG/1
20 TABLET ORAL NIGHTLY
Status: DISCONTINUED | OUTPATIENT
Start: 2018-03-13 | End: 2018-03-14 | Stop reason: HOSPADM

## 2018-03-13 RX ORDER — SODIUM CHLORIDE, SODIUM LACTATE, POTASSIUM CHLORIDE, CALCIUM CHLORIDE 600; 310; 30; 20 MG/100ML; MG/100ML; MG/100ML; MG/100ML
INJECTION, SOLUTION INTRAVENOUS CONTINUOUS
Status: DISCONTINUED | OUTPATIENT
Start: 2018-03-13 | End: 2018-03-13

## 2018-03-13 RX ORDER — LIDOCAINE HYDROCHLORIDE 10 MG/ML
INJECTION, SOLUTION EPIDURAL; INFILTRATION; INTRACAUDAL; PERINEURAL PRN
Status: DISCONTINUED | OUTPATIENT
Start: 2018-03-13 | End: 2018-03-13 | Stop reason: SDUPTHER

## 2018-03-13 RX ORDER — POLYETHYLENE GLYCOL 3350 17 G/17G
17 POWDER, FOR SOLUTION ORAL DAILY PRN
Status: DISCONTINUED | OUTPATIENT
Start: 2018-03-13 | End: 2018-03-14 | Stop reason: HOSPADM

## 2018-03-13 RX ORDER — FENTANYL CITRATE 50 UG/ML
25 INJECTION, SOLUTION INTRAMUSCULAR; INTRAVENOUS
Status: DISCONTINUED | OUTPATIENT
Start: 2018-03-13 | End: 2018-03-13 | Stop reason: HOSPADM

## 2018-03-13 RX ORDER — SODIUM CHLORIDE 0.9 % (FLUSH) 0.9 %
10 SYRINGE (ML) INJECTION PRN
Status: DISCONTINUED | OUTPATIENT
Start: 2018-03-13 | End: 2018-03-14 | Stop reason: HOSPADM

## 2018-03-13 RX ORDER — LABETALOL HYDROCHLORIDE 5 MG/ML
5 INJECTION, SOLUTION INTRAVENOUS EVERY 10 MIN PRN
Status: DISCONTINUED | OUTPATIENT
Start: 2018-03-13 | End: 2018-03-13 | Stop reason: HOSPADM

## 2018-03-13 RX ORDER — ROCURONIUM BROMIDE 10 MG/ML
INJECTION, SOLUTION INTRAVENOUS PRN
Status: DISCONTINUED | OUTPATIENT
Start: 2018-03-13 | End: 2018-03-13 | Stop reason: SDUPTHER

## 2018-03-13 RX ORDER — SODIUM CHLORIDE 0.9 % (FLUSH) 0.9 %
10 SYRINGE (ML) INJECTION ONCE
Status: COMPLETED | OUTPATIENT
Start: 2018-03-13 | End: 2018-03-13

## 2018-03-13 RX ORDER — MORPHINE SULFATE 4 MG/ML
4 INJECTION, SOLUTION INTRAMUSCULAR; INTRAVENOUS
Status: DISCONTINUED | OUTPATIENT
Start: 2018-03-13 | End: 2018-03-14 | Stop reason: HOSPADM

## 2018-03-13 RX ORDER — LISINOPRIL AND HYDROCHLOROTHIAZIDE 25; 20 MG/1; MG/1
1 TABLET ORAL DAILY
Status: DISCONTINUED | OUTPATIENT
Start: 2018-03-13 | End: 2018-03-13

## 2018-03-13 RX ORDER — MIDAZOLAM HYDROCHLORIDE 1 MG/ML
2 INJECTION INTRAMUSCULAR; INTRAVENOUS
Status: DISCONTINUED | OUTPATIENT
Start: 2018-03-13 | End: 2018-03-13 | Stop reason: HOSPADM

## 2018-03-13 RX ORDER — SODIUM CHLORIDE, SODIUM LACTATE, POTASSIUM CHLORIDE, CALCIUM CHLORIDE 600; 310; 30; 20 MG/100ML; MG/100ML; MG/100ML; MG/100ML
INJECTION, SOLUTION INTRAVENOUS CONTINUOUS PRN
Status: DISCONTINUED | OUTPATIENT
Start: 2018-03-13 | End: 2018-03-13 | Stop reason: SDUPTHER

## 2018-03-13 RX ORDER — SODIUM CHLORIDE 9 MG/ML
INJECTION, SOLUTION INTRAVENOUS CONTINUOUS
Status: DISCONTINUED | OUTPATIENT
Start: 2018-03-13 | End: 2018-03-14 | Stop reason: HOSPADM

## 2018-03-13 RX ORDER — SODIUM CHLORIDE 0.9 % (FLUSH) 0.9 %
10 SYRINGE (ML) INJECTION EVERY 12 HOURS SCHEDULED
Status: DISCONTINUED | OUTPATIENT
Start: 2018-03-13 | End: 2018-03-13 | Stop reason: HOSPADM

## 2018-03-13 RX ORDER — ACETAMINOPHEN 325 MG/1
650 TABLET ORAL EVERY 4 HOURS PRN
Status: DISCONTINUED | OUTPATIENT
Start: 2018-03-13 | End: 2018-03-14 | Stop reason: HOSPADM

## 2018-03-13 RX ORDER — SUCCINYLCHOLINE CHLORIDE 20 MG/ML
INJECTION INTRAMUSCULAR; INTRAVENOUS PRN
Status: DISCONTINUED | OUTPATIENT
Start: 2018-03-13 | End: 2018-03-13 | Stop reason: SDUPTHER

## 2018-03-13 RX ORDER — ENALAPRILAT 2.5 MG/2ML
1.25 INJECTION INTRAVENOUS
Status: DISCONTINUED | OUTPATIENT
Start: 2018-03-13 | End: 2018-03-13 | Stop reason: HOSPADM

## 2018-03-13 RX ORDER — MEPERIDINE HYDROCHLORIDE 50 MG/ML
12.5 INJECTION INTRAMUSCULAR; INTRAVENOUS; SUBCUTANEOUS EVERY 5 MIN PRN
Status: DISCONTINUED | OUTPATIENT
Start: 2018-03-13 | End: 2018-03-13 | Stop reason: HOSPADM

## 2018-03-13 RX ORDER — DIPHENHYDRAMINE HCL 25 MG
25 TABLET ORAL NIGHTLY PRN
Status: DISCONTINUED | OUTPATIENT
Start: 2018-03-13 | End: 2018-03-14 | Stop reason: HOSPADM

## 2018-03-13 RX ORDER — EPHEDRINE SULFATE 50 MG/ML
INJECTION, SOLUTION INTRAVENOUS PRN
Status: DISCONTINUED | OUTPATIENT
Start: 2018-03-13 | End: 2018-03-13 | Stop reason: SDUPTHER

## 2018-03-13 RX ORDER — DIPHENHYDRAMINE HYDROCHLORIDE 50 MG/ML
12.5 INJECTION INTRAMUSCULAR; INTRAVENOUS
Status: DISCONTINUED | OUTPATIENT
Start: 2018-03-13 | End: 2018-03-13 | Stop reason: HOSPADM

## 2018-03-13 RX ORDER — ATORVASTATIN CALCIUM 20 MG/1
20 TABLET, FILM COATED ORAL NIGHTLY
Status: DISCONTINUED | OUTPATIENT
Start: 2018-03-13 | End: 2018-03-14 | Stop reason: HOSPADM

## 2018-03-13 RX ORDER — OXYCODONE HYDROCHLORIDE AND ACETAMINOPHEN 5; 325 MG/1; MG/1
1 TABLET ORAL EVERY 4 HOURS PRN
Status: DISCONTINUED | OUTPATIENT
Start: 2018-03-13 | End: 2018-03-14 | Stop reason: HOSPADM

## 2018-03-13 RX ORDER — MORPHINE SULFATE 4 MG/ML
2 INJECTION, SOLUTION INTRAMUSCULAR; INTRAVENOUS
Status: DISCONTINUED | OUTPATIENT
Start: 2018-03-13 | End: 2018-03-14 | Stop reason: HOSPADM

## 2018-03-13 RX ORDER — LIDOCAINE HYDROCHLORIDE 10 MG/ML
1 INJECTION, SOLUTION EPIDURAL; INFILTRATION; INTRACAUDAL; PERINEURAL ONCE
Status: COMPLETED | OUTPATIENT
Start: 2018-03-13 | End: 2018-03-13

## 2018-03-13 RX ORDER — PROPOFOL 10 MG/ML
INJECTION, EMULSION INTRAVENOUS PRN
Status: DISCONTINUED | OUTPATIENT
Start: 2018-03-13 | End: 2018-03-13 | Stop reason: SDUPTHER

## 2018-03-13 RX ORDER — POTASSIUM CHLORIDE 750 MG/1
10 TABLET, EXTENDED RELEASE ORAL DAILY
Status: DISCONTINUED | OUTPATIENT
Start: 2018-03-13 | End: 2018-03-14 | Stop reason: HOSPADM

## 2018-03-13 RX ORDER — DEXAMETHASONE SODIUM PHOSPHATE 10 MG/ML
INJECTION INTRAMUSCULAR; INTRAVENOUS PRN
Status: DISCONTINUED | OUTPATIENT
Start: 2018-03-13 | End: 2018-03-13 | Stop reason: SDUPTHER

## 2018-03-13 RX ORDER — DOCUSATE SODIUM 100 MG/1
100 CAPSULE, LIQUID FILLED ORAL DAILY
Status: DISCONTINUED | OUTPATIENT
Start: 2018-03-13 | End: 2018-03-14 | Stop reason: HOSPADM

## 2018-03-13 RX ORDER — CARVEDILOL 25 MG/1
25 TABLET ORAL 2 TIMES DAILY WITH MEALS
Status: DISCONTINUED | OUTPATIENT
Start: 2018-03-13 | End: 2018-03-14 | Stop reason: HOSPADM

## 2018-03-13 RX ORDER — BUPIVACAINE HYDROCHLORIDE 2.5 MG/ML
INJECTION, SOLUTION INFILTRATION; PERINEURAL PRN
Status: DISCONTINUED | OUTPATIENT
Start: 2018-03-13 | End: 2018-03-13 | Stop reason: HOSPADM

## 2018-03-13 RX ADMIN — DEXAMETHASONE SODIUM PHOSPHATE 10 MG: 10 INJECTION INTRAMUSCULAR; INTRAVENOUS at 11:22

## 2018-03-13 RX ADMIN — CEFAZOLIN SODIUM 1 G: 1 INJECTION, SOLUTION INTRAVENOUS at 20:44

## 2018-03-13 RX ADMIN — EPHEDRINE SULFATE 10 MG: 50 INJECTION, SOLUTION INTRAMUSCULAR; INTRAVENOUS; SUBCUTANEOUS at 11:21

## 2018-03-13 RX ADMIN — CARVEDILOL 25 MG: 25 TABLET, FILM COATED ORAL at 18:54

## 2018-03-13 RX ADMIN — SODIUM CHLORIDE, SODIUM LACTATE, POTASSIUM CHLORIDE, AND CALCIUM CHLORIDE: 600; 310; 30; 20 INJECTION, SOLUTION INTRAVENOUS at 12:05

## 2018-03-13 RX ADMIN — TAMSULOSIN HYDROCHLORIDE 0.4 MG: 0.4 CAPSULE ORAL at 20:40

## 2018-03-13 RX ADMIN — ATORVASTATIN CALCIUM 20 MG: 20 TABLET, FILM COATED ORAL at 20:40

## 2018-03-13 RX ADMIN — Medication 10 ML: at 20:40

## 2018-03-13 RX ADMIN — Medication 100 MG: at 11:05

## 2018-03-13 RX ADMIN — DOCUSATE SODIUM 100 MG: 100 CAPSULE, LIQUID FILLED ORAL at 15:49

## 2018-03-13 RX ADMIN — SODIUM CHLORIDE: 9 INJECTION, SOLUTION INTRAVENOUS at 18:54

## 2018-03-13 RX ADMIN — PHENYLEPHRINE HYDROCHLORIDE 100 MCG: 10 INJECTION INTRAVENOUS at 11:39

## 2018-03-13 RX ADMIN — FENTANYL CITRATE 50 MCG: 50 INJECTION, SOLUTION INTRAMUSCULAR; INTRAVENOUS at 12:53

## 2018-03-13 RX ADMIN — SODIUM CHLORIDE, POTASSIUM CHLORIDE, SODIUM LACTATE AND CALCIUM CHLORIDE: 600; 310; 30; 20 INJECTION, SOLUTION INTRAVENOUS at 08:58

## 2018-03-13 RX ADMIN — EPHEDRINE SULFATE 10 MG: 50 INJECTION, SOLUTION INTRAMUSCULAR; INTRAVENOUS; SUBCUTANEOUS at 11:24

## 2018-03-13 RX ADMIN — HYDROMORPHONE HYDROCHLORIDE 0.5 MG: 1 INJECTION, SOLUTION INTRAMUSCULAR; INTRAVENOUS; SUBCUTANEOUS at 13:25

## 2018-03-13 RX ADMIN — OXYCODONE HYDROCHLORIDE AND ACETAMINOPHEN 1 TABLET: 5; 325 TABLET ORAL at 15:49

## 2018-03-13 RX ADMIN — FUROSEMIDE 20 MG: 20 TABLET ORAL at 20:40

## 2018-03-13 RX ADMIN — SODIUM CHLORIDE, SODIUM LACTATE, POTASSIUM CHLORIDE, AND CALCIUM CHLORIDE: 600; 310; 30; 20 INJECTION, SOLUTION INTRAVENOUS at 13:10

## 2018-03-13 RX ADMIN — OXYCODONE HYDROCHLORIDE AND ACETAMINOPHEN 1 TABLET: 5; 325 TABLET ORAL at 21:45

## 2018-03-13 RX ADMIN — EPHEDRINE SULFATE 10 MG: 50 INJECTION, SOLUTION INTRAMUSCULAR; INTRAVENOUS; SUBCUTANEOUS at 11:30

## 2018-03-13 RX ADMIN — FENTANYL CITRATE 100 MCG: 50 INJECTION, SOLUTION INTRAMUSCULAR; INTRAVENOUS at 11:04

## 2018-03-13 RX ADMIN — FENTANYL CITRATE 50 MCG: 50 INJECTION, SOLUTION INTRAMUSCULAR; INTRAVENOUS at 12:03

## 2018-03-13 RX ADMIN — PROPOFOL 50 MCG/KG/MIN: 10 INJECTION, EMULSION INTRAVENOUS at 11:15

## 2018-03-13 RX ADMIN — FENTANYL CITRATE 50 MCG: 50 INJECTION, SOLUTION INTRAMUSCULAR; INTRAVENOUS at 12:15

## 2018-03-13 RX ADMIN — LIDOCAINE HYDROCHLORIDE 1 ML: 10 INJECTION, SOLUTION EPIDURAL; INFILTRATION; INTRACAUDAL; PERINEURAL at 08:58

## 2018-03-13 RX ADMIN — EPHEDRINE SULFATE 10 MG: 50 INJECTION, SOLUTION INTRAMUSCULAR; INTRAVENOUS; SUBCUTANEOUS at 11:19

## 2018-03-13 RX ADMIN — Medication 10 ML: at 08:57

## 2018-03-13 RX ADMIN — PHENYLEPHRINE HYDROCHLORIDE 100 MCG: 10 INJECTION INTRAVENOUS at 11:25

## 2018-03-13 RX ADMIN — Medication 2 G: at 11:17

## 2018-03-13 RX ADMIN — LIDOCAINE HYDROCHLORIDE 50 MG: 10 INJECTION, SOLUTION EPIDURAL; INFILTRATION; INTRACAUDAL; PERINEURAL at 11:05

## 2018-03-13 RX ADMIN — HYDROMORPHONE HYDROCHLORIDE 1 MG: 1 INJECTION, SOLUTION INTRAMUSCULAR; INTRAVENOUS; SUBCUTANEOUS at 13:14

## 2018-03-13 RX ADMIN — PROPOFOL 180 MG: 10 INJECTION, EMULSION INTRAVENOUS at 11:05

## 2018-03-13 RX ADMIN — ONDANSETRON HYDROCHLORIDE 4 MG: 2 SOLUTION INTRAMUSCULAR; INTRAVENOUS at 12:57

## 2018-03-13 RX ADMIN — SODIUM CHLORIDE, SODIUM LACTATE, POTASSIUM CHLORIDE, AND CALCIUM CHLORIDE: 600; 310; 30; 20 INJECTION, SOLUTION INTRAVENOUS at 11:01

## 2018-03-13 RX ADMIN — ROCURONIUM BROMIDE 10 MG: 10 INJECTION INTRAVENOUS at 11:05

## 2018-03-13 RX ADMIN — MIDAZOLAM HYDROCHLORIDE 2 MG: 1 INJECTION, SOLUTION INTRAMUSCULAR; INTRAVENOUS at 11:01

## 2018-03-13 ASSESSMENT — LIFESTYLE VARIABLES: SMOKING_STATUS: 0

## 2018-03-13 ASSESSMENT — PAIN SCALES - GENERAL
PAINLEVEL_OUTOF10: 3
PAINLEVEL_OUTOF10: 3
PAINLEVEL_OUTOF10: 2

## 2018-03-13 ASSESSMENT — PAIN - FUNCTIONAL ASSESSMENT: PAIN_FUNCTIONAL_ASSESSMENT: 0-10

## 2018-03-14 VITALS
HEIGHT: 69 IN | BODY MASS INDEX: 37.03 KG/M2 | OXYGEN SATURATION: 94 % | RESPIRATION RATE: 18 BRPM | DIASTOLIC BLOOD PRESSURE: 80 MMHG | TEMPERATURE: 99.1 F | SYSTOLIC BLOOD PRESSURE: 144 MMHG | HEART RATE: 103 BPM | WEIGHT: 250 LBS

## 2018-03-14 LAB
HCT VFR BLD CALC: 36.6 % (ref 42–52)
HEMOGLOBIN: 12.1 G/DL (ref 14–18)
MCH RBC QN AUTO: 30.4 PG (ref 27–31)
MCHC RBC AUTO-ENTMCNC: 33.1 G/DL (ref 33–37)
MCV RBC AUTO: 92 FL (ref 80–94)
PDW BLD-RTO: 12.8 % (ref 11.5–14.5)
PLATELET # BLD: 195 K/UL (ref 130–400)
PMV BLD AUTO: 9.5 FL (ref 9.4–12.4)
RBC # BLD: 3.98 M/UL (ref 4.7–6.1)
WBC # BLD: 8.1 K/UL (ref 4.8–10.8)

## 2018-03-14 PROCEDURE — 97530 THERAPEUTIC ACTIVITIES: CPT

## 2018-03-14 PROCEDURE — 97116 GAIT TRAINING THERAPY: CPT

## 2018-03-14 PROCEDURE — 85027 COMPLETE CBC AUTOMATED: CPT

## 2018-03-14 PROCEDURE — 97535 SELF CARE MNGMENT TRAINING: CPT

## 2018-03-14 PROCEDURE — 2580000003 HC RX 258: Performed by: PHYSICIAN ASSISTANT

## 2018-03-14 PROCEDURE — 6370000000 HC RX 637 (ALT 250 FOR IP): Performed by: PHYSICIAN ASSISTANT

## 2018-03-14 PROCEDURE — 6370000000 HC RX 637 (ALT 250 FOR IP)

## 2018-03-14 PROCEDURE — 6360000002 HC RX W HCPCS: Performed by: PHYSICIAN ASSISTANT

## 2018-03-14 PROCEDURE — 36415 COLL VENOUS BLD VENIPUNCTURE: CPT

## 2018-03-14 RX ORDER — TIZANIDINE 4 MG/1
4 TABLET ORAL 3 TIMES DAILY
Qty: 40 TABLET | Refills: 1 | Status: SHIPPED | OUTPATIENT
Start: 2018-03-14

## 2018-03-14 RX ORDER — OXYCODONE HYDROCHLORIDE AND ACETAMINOPHEN 5; 325 MG/1; MG/1
1 TABLET ORAL EVERY 4 HOURS PRN
Qty: 30 TABLET | Refills: 0 | Status: SHIPPED | OUTPATIENT
Start: 2018-03-14 | End: 2018-03-21

## 2018-03-14 RX ADMIN — DOCUSATE SODIUM 100 MG: 100 CAPSULE, LIQUID FILLED ORAL at 08:22

## 2018-03-14 RX ADMIN — POTASSIUM CHLORIDE 10 MEQ: 10 TABLET, EXTENDED RELEASE ORAL at 08:22

## 2018-03-14 RX ADMIN — CEFAZOLIN SODIUM 1 G: 1 INJECTION, SOLUTION INTRAVENOUS at 03:58

## 2018-03-14 RX ADMIN — BENZOCAINE 6 MG-MENTHOL 10 MG LOZENGES 1 LOZENGE: at 00:22

## 2018-03-14 RX ADMIN — Medication 10 ML: at 08:24

## 2018-03-14 NOTE — PROGRESS NOTES
24 hr chart check completed.  Electronically signed by Jami Matias RN on 3/14/2018 at 3:36 AM
reach, Gait belt, Left in chair    G-Code  PT G-Codes  Functional Assessment Tool Used: BED>CHAIR  Score: CK, MIN  Functional Limitation: Mobility: Walking and moving around  Mobility: Walking and Moving Around Current Status (): At least 40 percent but less than 60 percent impaired, limited or restricted  Mobility: Walking and Moving Around Goal Status ():  At least 1 percent but less than 20 percent impaired, limited or restricted  OutComes Score                                           AM-PAC Score             Goals  Short term goals  Time Frame for Short term goals: 14 DAYS BEFORE RE EVAL  Short term goal 1: SUP<>SIT SBA  Short term goal 2: SIT<>STAND SBA  Short term goal 3:  FT WITH AD AS INDICATED AND SBA  Short term goal 4: STEPS WITH CGA       Therapy Time   Individual Concurrent Group Co-treatment   Time In           Time Out           Minutes                   Kierra Cagle PT    Electronically signed by Kierra Cagle PT on 3/13/2018 at 3:40 PM

## 2018-03-15 NOTE — OP NOTE
SURGICAL DEPARTMENT REPORT      NAME OF SURGEON/: Willie Myers MD      PREOPERATIVE DIAGNOSES  1. Lumbar stenosis, L45.  2. Lumbar degenerative disk disease, L45.  3. Lumbar Radiculopathy  4. Lower back pain  5. Degenerative spondylolisthesis L45      POSTOPERATIVE DIAGNOSES  Same      PROCEDURE  1. Left-sided lateral lumbar interbody fusion through a retroperitoneal  approach. 2. Placement of biomechanical PEEK interbody fusion space, L45, using a [id number] mm spacer. 3. Use of allograft bone  (Nanos, RTi)  4. Use of intraoperative neuromonitoring. 5. Use of intraoperative fluoroscopy for verification of positioning of the  interbody, as well as confirming the appropriate levels being addressed. 6.  Bone marrow aspiration from the iliac crest and use of autologous stem cells and growth factors from the [facility]  7. Posterior Spinal fusion L45 with locally obtained autograft bone  8. Pedicle screw instrumentation L45 (6.5x50 mm Core Link Tiger MIS screws)    ASSISTANT  KIMBERLY No assisted throughout all key components of this case by retracting soft  tissues and helping to enable adequate visualization to safely complete all  aspects of this procedure. He was scrubbed throughout the entirety of the  procedure.      ANESTHESIA  General endotracheal anesthesia.      ESTIMATED BLOOD LOSS:   <100 mL.      INTRAVENOUS FLUIDS  Crystalloid.     COMPLICATIONS  None.      IMPLANTS  See Brief op note.      PROCEDURE IN DETAIL     The patient was seen on the day of surgery in the preoperative holding area.  The operative site was identified and prophylactic antibiotics were administered.  The patient was taken to the operating room, where the patient was placed under general endotracheal anesthesia and positioned in the right lateral decubitus position with the left side up. An axillary roll was placed. All bony prominences were padded.  The patient was held in the lateral position with 3-inch wide silk musculature and the retractor with 140 mm blades was docked onto the operative disk space without any complication.  The intradiscal caitlin was introduced into the disk space and the retractor was opened cranio-caudally and anteriorly, exposing the annulus of the disk space nicely. The anterior retractor blade was placed in front of the anterior longitudinal ligament and behind the great vessels and stabilized by attaching it to the top of the retractor. A 15-blade scalpel was then used to create a large annulotomy and through this annulotomy Morse elevators were used to separate the cartilaginous portion of the end plates from the bony end plates and then to release the contralateral annulus. The disc space was prepared for fusion with the angled pituitaries, rasps and various curettes. Once this was completed and the cartilage had been removed from the end plates, the disk space was irrigated and suctioned.      The trial spacer was placed into the disk space. It was found that a [id number] mm spacer was the appropriate size spacer. A PEEK spacer of this size was then obtained and packed with a mixture of autograft and allograft bone. The spacer was tamped to the appropriate depth and then the insertion handle was removed. The C-arm fluoroscopy images showed satisfactory positioning of the implant and excellent restoration of the disk space height, as well as foraminal volume.      The wound was irrigated and suctioned one last time. The caitlin was removed from the disk space. The retractor was closed somewhat. There was no excessive bleeding noted within the psoas. Hemostatic powder was placed over the insertion site of the spacer. A mixture of Marcaine and Decadron was placed over the powder and then the retractor was removed. The peritoneal contents were allowed to fall back into their native position.      At this point, attention was paid to the posterior portion of the fusion.   A Adiel incision was made over the operative level(s), exposing the starting point for the pedicle screws. The facet joint(s) of the operative level was/were exposed and confirmed to be the appropriate level. The facet joint was then destroyed by curetting it. The curettaged bone was left in position to promote a posterolateral fusion.        Once this was completed, the pedicles of the operative levels were cannulated using a pedicle awl. Once the pedicles were cannulated, guidewires were placed through the central portion of the Jamshidi needles used to create the pedicle screw paths. The Jamshidi needles were then removed. Pedicle screws were inserted over the guidewires and seated into the vertebral bodies through the pedicles to the appropriate depth. The screwdrivers were  removed.      The ramirez measuring tool was then used to determine the appropriate length ramirez necessary to expand the construct. A ramirez of this size was then obtained and slid in a submuscular fashion into the saddles of the pedicle screws through the insertion towers. The locking caps were then applied and torqued to the appropriate specifications. The insertion towers were removed. The C-arm was used to obtain final images, showing satisfactory positioning of the implants.      The wounds were irrigated and suctioned and then closed using 2-0 Vicryl to reapproximate the subcutaneous skin, and a running subcuticular Monocryl to reapproximate the skin edges.  Mastisol, Steri-Strips and sterile dressings were applied.      The patient was then transferred to a supine position on a standard recovery room bed, was awakened from general endotracheal anesthesia, extubated and taken to the recovery room in satisfactory condition. There were no complications.  All counts were correct at the end of the case.

## 2018-03-16 ENCOUNTER — TELEPHONE (OUTPATIENT)
Dept: CARDIOLOGY | Facility: CLINIC | Age: 63
End: 2018-03-16

## 2018-03-16 NOTE — TELEPHONE ENCOUNTER
----- Message from Marcio Helton MD sent at 3/12/2018  8:11 AM CDT -----  Has a small area of ischemia. Should be ok to proceed with surgery. Can cath if pt wants to    Pt informed.  He wants to wait on the heart cath.  Bassam Dickson, CMA

## 2018-03-26 RX ORDER — POTASSIUM CHLORIDE 750 MG/1
10 TABLET, EXTENDED RELEASE ORAL DAILY
Qty: 90 TABLET | Refills: 3 | Status: SHIPPED | OUTPATIENT
Start: 2018-03-26 | End: 2019-03-12 | Stop reason: SDUPTHER

## 2018-03-26 RX ORDER — FUROSEMIDE 20 MG/1
20 TABLET ORAL DAILY
Qty: 90 TABLET | Refills: 3 | Status: SHIPPED | OUTPATIENT
Start: 2018-03-26 | End: 2019-03-12 | Stop reason: SDUPTHER

## 2018-03-26 RX ORDER — ATORVASTATIN CALCIUM 20 MG/1
20 TABLET, FILM COATED ORAL
Qty: 90 TABLET | Refills: 3 | Status: SHIPPED | OUTPATIENT
Start: 2018-03-26 | End: 2019-03-12 | Stop reason: SDUPTHER

## 2018-03-26 NOTE — TELEPHONE ENCOUNTER
Needs refills on Lipitor, Lasix and [facility].  Last appt 2/26/18.   Bassam Dickson, CMA

## 2018-12-06 ENCOUNTER — RESULTS ENCOUNTER (OUTPATIENT)
Dept: UROLOGY | Facility: CLINIC | Age: 63
End: 2018-12-06

## 2018-12-06 DIAGNOSIS — N13.8 BPH WITH URINARY OBSTRUCTION: ICD-10-CM

## 2018-12-06 DIAGNOSIS — N40.1 BPH WITH URINARY OBSTRUCTION: ICD-10-CM

## 2018-12-07 NOTE — PROGRESS NOTES
Subjective    Mr. Newman is 63 y.o. male    Chief Complaint: Urinary Retention/BPH    History of Present Illness   Urinary Retention  Patient complains of urinary retention. Onset of retention was 2 yearsand was sudden in onset. Patient currently does not have a urinary catheter in place. 1600 ml of urine were drained when catheter was placed. Prior to this event voiding symptoms consisted of slow stream. Prior treatments include watchful waiting. Recent medications that may have affected his voiding include none.      Benign Prostatic Hypertrophy  Patient complains of lower urinary tract symptoms. He reports frequency, incomplete emptying and weak stream. He denies nocturia one time a night and straining. Patient states symptoms are of mild severity. Onset of symptoms was 1 years ago and was sudden in onset. His AUA Symptom Score is, 6/35. He has no personal history of prostate cancer. He reports a history of no complicating symptoms. He denies flank pain, gross hematuria, kidney stones and recurrent UTI. Most recent PSA 2.3.      Lab Results   Component Value Date    PSA 1.800 12/02/2016       Erectile Dysfunction  Patient complains of erectile dysfunction. Onset of dysfunction was several years ago and was gradual in onset. Patient states the nature of difficulty is maintaining erection. Full erections occur never. Partial erections occur never. Libido is not affected. Risk factors for ED include cardiovascular disease. Patient denies history of cardiovascular disease. Previous treatment of ED includes none.      The following portions of the patient's history were reviewed and updated as appropriate: allergies, current medications, past family history, past medical history, past social history, past surgical history and problem list.    Review of Systems   Constitutional: Negative for chills and fever.   Gastrointestinal: Negative for abdominal pain, anal bleeding and blood in stool.   Genitourinary: Positive  for difficulty urinating. Negative for decreased urine volume, discharge, dysuria, enuresis, flank pain, frequency, genital sores, hematuria, penile pain, penile swelling, scrotal swelling, testicular pain and urgency.         Current Outpatient Medications:   •  5-HYDROXYTRYPTOPHAN PO, Take  by mouth., Disp: , Rfl:   •  amLODIPine (NORVASC) 10 MG tablet, Take 10 mg by mouth Daily., Disp: , Rfl:   •  aspirin 81 MG EC tablet, Take 81 mg by mouth Daily., Disp: , Rfl:   •  atorvastatin (LIPITOR) 20 MG tablet, Take 1 tablet by mouth every night at bedtime., Disp: 90 tablet, Rfl: 3  •  carvedilol (COREG) 25 MG tablet, Take 25 mg by mouth 2 (Two) Times a Day With Meals., Disp: , Rfl:   •  cholecalciferol (VITAMIN D3) 1000 units tablet, Take 1,000 Units by mouth Daily., Disp: , Rfl:   •  CHROMIUM PO, Take  by mouth., Disp: , Rfl:   •  DICLOFENAC PO, Take 75 mg by mouth., Disp: , Rfl:   •  furosemide (LASIX) 20 MG tablet, Take 1 tablet by mouth Daily., Disp: 90 tablet, Rfl: 3  •  IODINE-VITAMIN A PO, Take  by mouth., Disp: , Rfl:   •  lisinopril-hydrochlorothiazide (PRINZIDE,ZESTORETIC) 20-25 MG per tablet, , Disp: , Rfl:   •  magnesium gluconate (MAGONATE) 500 MG tablet, Take 500 mg by mouth 2 (Two) Times a Day., Disp: , Rfl:   •  metFORMIN ER (GLUCOPHAGE-XR) 500 MG 24 hr tablet, Take 500 mg by mouth Daily With Breakfast., Disp: , Rfl:   •  Multiple Vitamin (MULTI-VITAMIN DAILY PO), Take  by mouth., Disp: , Rfl:   •  Omega-3 Fatty Acids (FISH OIL) 1000 MG capsule capsule, Take  by mouth 2 (Two) Times a Day With Meals., Disp: , Rfl:   •  potassium chloride (K-DUR,KLOR-CON) 10 MEQ CR tablet, Take 1 tablet by mouth Daily., Disp: 90 tablet, Rfl: 3  •  tamsulosin (FLOMAX) 0.4 MG capsule 24 hr capsule, Take 1 capsule by mouth Every Night., Disp: 90 capsule, Rfl: 3    Past Medical History:   Diagnosis Date   • CAD (coronary artery disease)    • Cancer (CMS/HCC)     skin   • Hyperchloremia    • Hyperlipidemia    • Hypertension   "  • Myocardial infarction    • Peripheral vascular disease (CMS/HCC)    • Retention, urine    • S/P coronary artery stent placement     right side   • Sleep apnea        Past Surgical History:   Procedure Laterality Date   • ANKLE ARTHROPLASTY     • CARDIAC CATHETERIZATION     • CORONARY STENT PLACEMENT     • FOOT SURGERY Left    • HERNIA REPAIR     • REPLACEMENT TOTAL KNEE         Social History     Socioeconomic History   • Marital status:      Spouse name: Not on file   • Number of children: Not on file   • Years of education: Not on file   • Highest education level: Not on file   Tobacco Use   • Smoking status: Never Smoker   • Smokeless tobacco: Never Used   Substance and Sexual Activity   • Alcohol use: No   • Drug use: No   • Sexual activity: Defer       Family History   Problem Relation Age of Onset   • Hypertension Father    • Cancer Father    • Cancer Mother    • Hypertension Mother    • Immunodeficiency Sister    • Hypertension Brother        Objective    Temp 97.3 °F (36.3 °C)   Ht 172.7 cm (68\")   Wt 116 kg (255 lb)   BMI 38.77 kg/m²     Physical Exam   Constitutional: He is oriented to person, place, and time. He appears well-developed and well-nourished.   Pulmonary/Chest: Effort normal.   Abdominal: Soft. He exhibits no distension and no mass. There is no tenderness. There is no rebound and no guarding. No hernia.   Genitourinary: Penis normal. Rectal exam shows no mass, no tenderness and anal tone normal. Enlarged: for the age of the patient. Right testis shows no mass, no swelling and no tenderness. Left testis shows no mass, no swelling and no tenderness. No hypospadias. No discharge found.   Genitourinary Comments:  The urethral meatus normal in position without evidence of stricture. Epididymis without mass or tenderness. Vas Deferens is palpably normal.Anus and perineum without mass or tenderness. The prostate is approximately 50 ml. It is Symmetric, with a Soft consistency. There are " no nodules present. . The seminal vesicles are Not palpable due to the size of the prostate.     Neurological: He is alert and oriented to person, place, and time.   Vitals reviewed.          Results for orders placed or performed in visit on 12/10/18   POC Urinalysis Dipstick, Multipro   Result Value Ref Range    Color Yellow Yellow, Straw, Dark Yellow, Araceli    Clarity, UA Clear Clear    Glucose, UA Negative Negative, 1000 mg/dL (3+) mg/dL    Bilirubin Negative Negative    Ketones, UA Negative Negative    Specific Gravity  1.020 1.005 - 1.030    Blood, UA Trace (A) Negative    pH, Urine 7.0 5.0 - 8.0    Protein, POC Negative Negative mg/dL    Urobilinogen, UA Normal Normal    Nitrite, UA Negative Negative    Leukocytes Negative Negative   Patient's Body mass index is 38.77 kg/m². BMI is above normal parameters. Recommendations include: educational material.    Assessment and Plan    Diagnoses and all orders for this visit:    BPH with urinary obstruction  -     POC Urinalysis Dipstick, Multipro  -     tamsulosin (FLOMAX) 0.4 MG capsule 24 hr capsule; Take 1 capsule by mouth Every Night.  -     PSA DIAGNOSTIC; Future    Erectile dysfunction due to arterial insufficiency    Retention of urine    PSA stable at 2.3(2.2previously).  His voiding symptoms are stable we will continue Flomax.  Follow-up in 1 year.

## 2018-12-10 ENCOUNTER — OFFICE VISIT (OUTPATIENT)
Dept: UROLOGY | Facility: CLINIC | Age: 63
End: 2018-12-10

## 2018-12-10 VITALS — HEIGHT: 68 IN | TEMPERATURE: 97.3 F | BODY MASS INDEX: 38.65 KG/M2 | WEIGHT: 255 LBS

## 2018-12-10 DIAGNOSIS — N52.01 ERECTILE DYSFUNCTION DUE TO ARTERIAL INSUFFICIENCY: ICD-10-CM

## 2018-12-10 DIAGNOSIS — N13.8 BPH WITH URINARY OBSTRUCTION: Primary | ICD-10-CM

## 2018-12-10 DIAGNOSIS — N40.1 BPH WITH URINARY OBSTRUCTION: Primary | ICD-10-CM

## 2018-12-10 DIAGNOSIS — R33.9 RETENTION OF URINE: ICD-10-CM

## 2018-12-10 LAB
BILIRUB BLD-MCNC: NEGATIVE MG/DL
CLARITY, POC: CLEAR
COLOR UR: YELLOW
GLUCOSE UR STRIP-MCNC: NEGATIVE MG/DL
KETONES UR QL: NEGATIVE
LEUKOCYTE EST, POC: NEGATIVE
NITRITE UR-MCNC: NEGATIVE MG/ML
PH UR: 7 [PH] (ref 5–8)
PROT UR STRIP-MCNC: NEGATIVE MG/DL
RBC # UR STRIP: ABNORMAL /UL
SP GR UR: 1.02 (ref 1–1.03)
UROBILINOGEN UR QL: NORMAL

## 2018-12-10 PROCEDURE — 99213 OFFICE O/P EST LOW 20 MIN: CPT | Performed by: UROLOGY

## 2018-12-10 PROCEDURE — 81001 URINALYSIS AUTO W/SCOPE: CPT | Performed by: UROLOGY

## 2018-12-10 RX ORDER — TAMSULOSIN HYDROCHLORIDE 0.4 MG/1
1 CAPSULE ORAL NIGHTLY
Qty: 90 CAPSULE | Refills: 3 | Status: SHIPPED | OUTPATIENT
Start: 2018-12-10

## 2018-12-10 NOTE — PATIENT INSTRUCTIONS

## 2019-03-04 ENCOUNTER — OFFICE VISIT (OUTPATIENT)
Dept: CARDIOLOGY | Facility: CLINIC | Age: 64
End: 2019-03-04

## 2019-03-04 VITALS
DIASTOLIC BLOOD PRESSURE: 86 MMHG | BODY MASS INDEX: 36.88 KG/M2 | HEIGHT: 69 IN | SYSTOLIC BLOOD PRESSURE: 150 MMHG | HEART RATE: 61 BPM | OXYGEN SATURATION: 98 % | WEIGHT: 249 LBS

## 2019-03-04 DIAGNOSIS — I25.10 CORONARY ARTERY DISEASE INVOLVING NATIVE CORONARY ARTERY OF NATIVE HEART WITHOUT ANGINA PECTORIS: Primary | ICD-10-CM

## 2019-03-04 DIAGNOSIS — E78.2 MIXED HYPERLIPIDEMIA: ICD-10-CM

## 2019-03-04 DIAGNOSIS — I10 ESSENTIAL HYPERTENSION: ICD-10-CM

## 2019-03-04 DIAGNOSIS — R06.09 DYSPNEA ON EXERTION: Primary | ICD-10-CM

## 2019-03-04 PROCEDURE — 99214 OFFICE O/P EST MOD 30 MIN: CPT | Performed by: INTERNAL MEDICINE

## 2019-03-04 PROCEDURE — 93000 ELECTROCARDIOGRAM COMPLETE: CPT | Performed by: INTERNAL MEDICINE

## 2019-03-04 NOTE — PROGRESS NOTES
Referring Provider: Yobani Aguillon MD    Reason for Follow-up Visit: CAD    Subjective .   Chief Complaint:   Chief Complaint   Patient presents with   • Coronary Artery Disease     1 Year follow up. Patient says he has not had any chest pain, shortness of breath or swelling.   • Hypertension     Patient says his blood pressure has been good when he takes it at home.   • Hyperlipidemia     LDL was 61 12/3/18 taking Lipitor 20 mg daily.   • Surgical Clearance     Having right shoulder replacement April 2,2019 by Dr Khan. Needs cardiac clearance for surgery.       History of present illness:  Caron Newman is a 63 y.o. yo male with history of CAD, s/p PRISCILLA top RCA 2014. Denies CP or [facility]. Needs clearance for surgery on shoulder       History  Past Medical History:   Diagnosis Date   • CAD (coronary artery disease)    • Cancer (CMS/HCC)     skin   • Hyperchloremia    • Hyperlipidemia    • Hypertension    • Myocardial infarction (CMS/HCC)    • Peripheral vascular disease (CMS/HCC)    • Retention, urine    • S/P coronary artery stent placement     right side   • Sleep apnea    ,   Past Surgical History:   Procedure Laterality Date   • ANKLE ARTHROPLASTY     • BACK SURGERY  03/2018    L4-L5   • CARDIAC CATHETERIZATION     • CORONARY STENT PLACEMENT     • FOOT SURGERY Left    • HERNIA REPAIR     • REPLACEMENT TOTAL KNEE     ,   Family History   Problem Relation Age of Onset   • Hypertension Father    • Cancer Father    • Cancer Mother    • Hypertension Mother    • Immunodeficiency Sister    • Hypertension Brother    ,   Social History     Tobacco Use   • Smoking status: Never Smoker   • Smokeless tobacco: Never Used   Substance Use Topics   • Alcohol use: No   • Drug use: No   ,     Medications  Current Outpatient Medications   Medication Sig Dispense Refill   • 5-HYDROXYTRYPTOPHAN PO Take  by mouth.     • amLODIPine (NORVASC) 10 MG tablet Take 10 mg by mouth Daily.     • aspirin 81 MG EC tablet Take 81 mg by mouth  "Daily.     • atorvastatin (LIPITOR) 20 MG tablet Take 1 tablet by mouth every night at bedtime. 90 tablet 3   • carvedilol (COREG) 25 MG tablet Take 25 mg by mouth 2 (Two) Times a Day With Meals.     • cholecalciferol (VITAMIN D3) 1000 units tablet Take 1,000 Units by mouth Daily.     • CHROMIUM PO Take  by mouth.     • furosemide (LASIX) 20 MG tablet Take 1 tablet by mouth Daily. 90 tablet 3   • IODINE-VITAMIN A PO Take 6.25 mg by mouth Daily.     • lisinopril-hydrochlorothiazide (PRINZIDE,ZESTORETIC) 20-25 MG per tablet Take 1 tablet by mouth Daily.     • magnesium gluconate (MAGONATE) 500 MG tablet Take 500 mg by mouth 2 (Two) Times a Day.     • metFORMIN ER (GLUCOPHAGE-XR) 500 MG 24 hr tablet Take 500 mg by mouth Daily With Breakfast.     • Multiple Vitamin (MULTI-VITAMIN DAILY PO) Take  by mouth.     • Omega-3 Fatty Acids (FISH OIL) 1000 MG capsule capsule Take  by mouth 2 (Two) Times a Day With Meals.     • potassium chloride (K-DUR,KLOR-CON) 10 MEQ CR tablet Take 1 tablet by mouth Daily. 90 tablet 3   • tamsulosin (FLOMAX) 0.4 MG capsule 24 hr capsule Take 1 capsule by mouth Every Night. 90 capsule 3     No current facility-administered medications for this visit.        Allergies:  Ciprofloxacin    Review of Systems  Review of Systems   HENT: Negative for nosebleeds.    Cardiovascular: Negative for chest pain, claudication, dyspnea on exertion, irregular heartbeat, leg swelling, near-syncope, orthopnea, palpitations, paroxysmal nocturnal dyspnea and syncope.   Respiratory: Negative for cough, hemoptysis and shortness of breath.    Musculoskeletal: Positive for joint pain.   Gastrointestinal: Negative for dysphagia, hematemesis and melena.   Genitourinary: Negative for hematuria.   All other systems reviewed and are negative.      Objective     Physical Exam:  /86 (BP Location: Left arm, Patient Position: Sitting)   Pulse 61   Ht 175.3 cm (69\")   Wt 113 kg (249 lb)   SpO2 98%   BMI 36.77 kg/m² "   Physical Exam   Constitutional: He is oriented to person, place, and time. He appears well-nourished. No distress.   HENT:   Head: Normocephalic.   Eyes: No scleral icterus.   Neck: Normal range of motion. Neck supple.   Cardiovascular: Normal rate, regular rhythm and normal heart sounds. Exam reveals no gallop and no friction rub.   No murmur heard.  Pulmonary/Chest: Effort normal and breath sounds normal. No respiratory distress. He has no wheezes. He has no rales.   Abdominal: Soft. Bowel sounds are normal. He exhibits no distension. There is no tenderness.   Musculoskeletal: He exhibits no edema.   Neurological: He is alert and oriented to person, place, and time.   Skin: Skin is warm and dry. He is not diaphoretic. No erythema.   Psychiatric: He has a normal mood and affect. His behavior is normal.       Results Review:    ECG 12 Lead  Date/Time: 3/4/2019 10:30 AM  Performed by: Marcio Helton MD  Authorized by: Marcio Helton MD   Comparison: compared with previous ECG   Similar to previous ECG  Rhythm: sinus rhythm  Ectopy: unifocal PVCs  Rate: normal  Conduction: conduction normal  ST Segments: ST segments normal  T Waves: T waves normal  QRS axis: normal    Clinical impression: non-specific ECG            Office Visit on 12/10/2018   Component Date Value Ref Range Status   • Color 12/10/2018 Yellow  Yellow, Straw, Dark Yellow, Araceli Final   • Clarity, UA 12/10/2018 Clear  Clear Final   • Glucose, UA 12/10/2018 Negative  Negative, 1000 mg/dL (3+) mg/dL Final   • Bilirubin 12/10/2018 Negative  Negative Final   • Ketones, UA 12/10/2018 Negative  Negative Final   • Specific Gravity  12/10/2018 1.020  1.005 - 1.030 Final   • Blood, UA 12/10/2018 Trace* Negative Final   • pH, Urine 12/10/2018 7.0  5.0 - 8.0 Final   • Protein, POC 12/10/2018 Negative  Negative mg/dL Final   • Urobilinogen, UA 12/10/2018 Normal  Normal Final   • Nitrite, UA 12/10/2018 Negative  Negative Final   • Leukocytes 12/10/2018 Negative   Negative Final       Assessment/Plan   Rexal was seen today for coronary artery disease, hypertension, hyperlipidemia and surgical clearance.    Diagnoses and all orders for this visit:    Coronary artery disease involving native coronary artery of native heart without angina pectoris, last stress test was borderline pos for ischemia. Will repeat    Essential hypertension, usually runs normal at home    Mixed hyperlipidemia, Patient's statin therapy is followed by PCP.    Other orders  -     ECG 12 Lead        Patient's Body mass index is 36.77 kg/m². BMI is above normal parameters. Recommendations include: exercise counseling.

## 2019-03-14 ENCOUNTER — HOSPITAL ENCOUNTER (OUTPATIENT)
Dept: CARDIOLOGY | Facility: HOSPITAL | Age: 64
Discharge: HOME | End: 2019-03-14
Admitting: INTERNAL MEDICINE

## 2019-03-14 VITALS
DIASTOLIC BLOOD PRESSURE: 71 MMHG | HEIGHT: 69 IN | HEART RATE: 47 BPM | WEIGHT: 245 LBS | BODY MASS INDEX: 36.29 KG/M2 | SYSTOLIC BLOOD PRESSURE: 121 MMHG

## 2019-03-14 DIAGNOSIS — R06.09 DYSPNEA ON EXERTION: ICD-10-CM

## 2019-03-14 PROCEDURE — 25010000002 PERFLUTREN 6.52 MG/ML SUSPENSION: Performed by: INTERNAL MEDICINE

## 2019-03-14 PROCEDURE — 93350 STRESS TTE ONLY: CPT | Performed by: INTERNAL MEDICINE

## 2019-03-14 PROCEDURE — 93017 CV STRESS TEST TRACING ONLY: CPT

## 2019-03-14 PROCEDURE — 93018 CV STRESS TEST I&R ONLY: CPT | Performed by: INTERNAL MEDICINE

## 2019-03-14 PROCEDURE — 93352 ADMIN ECG CONTRAST AGENT: CPT | Performed by: INTERNAL MEDICINE

## 2019-03-14 PROCEDURE — 93350 STRESS TTE ONLY: CPT

## 2019-03-14 RX ADMIN — PERFLUTREN 8.48 MG: 6.52 INJECTION, SUSPENSION INTRAVENOUS at 11:51

## 2019-03-15 ENCOUNTER — PREP FOR SURGERY (OUTPATIENT)
Dept: OTHER | Facility: HOSPITAL | Age: 64
End: 2019-03-15

## 2019-03-15 DIAGNOSIS — R94.39 ABNORMAL STRESS ECHO: Primary | ICD-10-CM

## 2019-03-15 LAB
BH CV STRESS BP STAGE 1: NORMAL
BH CV STRESS BP STAGE 2: NORMAL
BH CV STRESS DURATION MIN STAGE 1: 3
BH CV STRESS DURATION MIN STAGE 2: 2
BH CV STRESS DURATION SEC STAGE 1: 0
BH CV STRESS DURATION SEC STAGE 2: 49
BH CV STRESS GRADE STAGE 1: 10
BH CV STRESS GRADE STAGE 2: 12
BH CV STRESS HR STAGE 1: 106
BH CV STRESS HR STAGE 2: 130
BH CV STRESS METS STAGE 1: 5
BH CV STRESS METS STAGE 2: 7.5
BH CV STRESS PROTOCOL 1: NORMAL
BH CV STRESS RECOVERY BP: NORMAL MMHG
BH CV STRESS RECOVERY HR: 70 BPM
BH CV STRESS SPEED STAGE 1: 1.7
BH CV STRESS SPEED STAGE 2: 2.5
BH CV STRESS STAGE 1: 1
BH CV STRESS STAGE 2: 2
MAXIMAL PREDICTED HEART RATE: 155 BPM
PERCENT MAX PREDICTED HR: 83.87 %
STRESS BASELINE BP: NORMAL MMHG
STRESS BASELINE HR: 45 BPM
STRESS PERCENT HR: 99 %
STRESS POST ESTIMATED WORKLOAD: 7.5 METS
STRESS POST EXERCISE DUR MIN: 5 MIN
STRESS POST EXERCISE DUR SEC: 49 SEC
STRESS POST PEAK BP: NORMAL MMHG
STRESS POST PEAK HR: 130 BPM
STRESS TARGET HR: 132 BPM

## 2019-03-15 RX ORDER — SODIUM CHLORIDE 0.9 % (FLUSH) 0.9 %
3-10 SYRINGE (ML) INJECTION AS NEEDED
Status: CANCELLED | OUTPATIENT
Start: 2019-03-15

## 2019-03-15 RX ORDER — SODIUM CHLORIDE 0.9 % (FLUSH) 0.9 %
3 SYRINGE (ML) INJECTION EVERY 12 HOURS SCHEDULED
Status: CANCELLED | OUTPATIENT
Start: 2019-03-15

## 2019-03-18 RX ORDER — POTASSIUM CHLORIDE 750 MG/1
TABLET, EXTENDED RELEASE ORAL
Qty: 90 TABLET | Refills: 3 | Status: SHIPPED | OUTPATIENT
Start: 2019-03-18

## 2019-03-18 RX ORDER — FUROSEMIDE 20 MG/1
TABLET ORAL
Qty: 90 TABLET | Refills: 3 | Status: SHIPPED | OUTPATIENT
Start: 2019-03-18

## 2019-03-18 RX ORDER — ATORVASTATIN CALCIUM 20 MG/1
TABLET, FILM COATED ORAL
Qty: 90 TABLET | Refills: 3 | Status: SHIPPED | OUTPATIENT
Start: 2019-03-18

## 2019-03-20 ENCOUNTER — HOSPITAL ENCOUNTER (OUTPATIENT)
Facility: HOSPITAL | Age: 64
Discharge: HOME | End: 2019-03-20
Attending: INTERNAL MEDICINE | Admitting: NURSE PRACTITIONER

## 2019-03-20 VITALS
RESPIRATION RATE: 16 BRPM | OXYGEN SATURATION: 98 % | DIASTOLIC BLOOD PRESSURE: 88 MMHG | HEIGHT: 69 IN | HEART RATE: 62 BPM | TEMPERATURE: 98.2 F | WEIGHT: 245.5 LBS | BODY MASS INDEX: 36.36 KG/M2 | SYSTOLIC BLOOD PRESSURE: 136 MMHG

## 2019-03-20 DIAGNOSIS — R94.39 ABNORMAL STRESS ECHO: ICD-10-CM

## 2019-03-20 LAB
ANION GAP SERPL CALCULATED.3IONS-SCNC: 8 MMOL/L (ref 4–13)
BASOPHILS # BLD AUTO: 0.03 10*3/MM3 (ref 0–0.2)
BASOPHILS NFR BLD AUTO: 0.5 % (ref 0–2)
BUN BLD-MCNC: 23 MG/DL (ref 5–21)
BUN/CREAT SERPL: 26.7 (ref 7–25)
CALCIUM SPEC-SCNC: 8.9 MG/DL (ref 8.4–10.4)
CHLORIDE SERPL-SCNC: 101 MMOL/L (ref 98–110)
CO2 SERPL-SCNC: 30 MMOL/L (ref 24–31)
CREAT BLD-MCNC: 0.86 MG/DL (ref 0.5–1.4)
DEPRECATED RDW RBC AUTO: 43.1 FL (ref 40–54)
EOSINOPHIL # BLD AUTO: 0.19 10*3/MM3 (ref 0–0.7)
EOSINOPHIL NFR BLD AUTO: 3.2 % (ref 0–4)
ERYTHROCYTE [DISTWIDTH] IN BLOOD BY AUTOMATED COUNT: 13.3 % (ref 12–15)
GFR SERPL CREATININE-BSD FRML MDRD: 89 ML/MIN/1.73
GLUCOSE BLD-MCNC: 102 MG/DL (ref 70–100)
HCT VFR BLD AUTO: 40.3 % (ref 40–52)
HGB BLD-MCNC: 13.9 G/DL (ref 14–18)
IMM GRANULOCYTES # BLD AUTO: 0.02 10*3/MM3 (ref 0–0.05)
IMM GRANULOCYTES NFR BLD AUTO: 0.3 % (ref 0–5)
INR PPP: 1.05 (ref 0.91–1.09)
LYMPHOCYTES # BLD AUTO: 1.06 10*3/MM3 (ref 0.72–4.86)
LYMPHOCYTES NFR BLD AUTO: 17.7 % (ref 15–45)
MCH RBC QN AUTO: 30.5 PG (ref 28–32)
MCHC RBC AUTO-ENTMCNC: 34.5 G/DL (ref 33–36)
MCV RBC AUTO: 88.6 FL (ref 82–95)
MONOCYTES # BLD AUTO: 0.62 10*3/MM3 (ref 0.19–1.3)
MONOCYTES NFR BLD AUTO: 10.4 % (ref 4–12)
NEUTROPHILS # BLD AUTO: 4.07 10*3/MM3 (ref 1.87–8.4)
NEUTROPHILS NFR BLD AUTO: 67.9 % (ref 39–78)
NRBC BLD AUTO-RTO: 0 /100 WBC (ref 0–0)
PLATELET # BLD AUTO: 222 10*3/MM3 (ref 130–400)
PMV BLD AUTO: 8.8 FL (ref 6–12)
POTASSIUM BLD-SCNC: 4.2 MMOL/L (ref 3.5–5.3)
PROTHROMBIN TIME: 14 SECONDS (ref 11.9–14.6)
RBC # BLD AUTO: 4.55 10*6/MM3 (ref 4.8–5.9)
SODIUM BLD-SCNC: 139 MMOL/L (ref 135–145)
WBC NRBC COR # BLD: 5.99 10*3/MM3 (ref 4.8–10.8)

## 2019-03-20 PROCEDURE — C1760 CLOSURE DEV, VASC: HCPCS | Performed by: INTERNAL MEDICINE

## 2019-03-20 PROCEDURE — 85025 COMPLETE CBC W/AUTO DIFF WBC: CPT | Performed by: NURSE PRACTITIONER

## 2019-03-20 PROCEDURE — 93458 L HRT ARTERY/VENTRICLE ANGIO: CPT | Performed by: INTERNAL MEDICINE

## 2019-03-20 PROCEDURE — 25010000002 FENTANYL CITRATE (PF) 100 MCG/2ML SOLUTION: Performed by: INTERNAL MEDICINE

## 2019-03-20 PROCEDURE — 85610 PROTHROMBIN TIME: CPT | Performed by: NURSE PRACTITIONER

## 2019-03-20 PROCEDURE — 80048 BASIC METABOLIC PNL TOTAL CA: CPT | Performed by: NURSE PRACTITIONER

## 2019-03-20 PROCEDURE — C1769 GUIDE WIRE: HCPCS | Performed by: INTERNAL MEDICINE

## 2019-03-20 PROCEDURE — 25010000002 MIDAZOLAM PER 1 MG: Performed by: INTERNAL MEDICINE

## 2019-03-20 PROCEDURE — 25010000002 DIPHENHYDRAMINE PER 50 MG: Performed by: INTERNAL MEDICINE

## 2019-03-20 PROCEDURE — C1894 INTRO/SHEATH, NON-LASER: HCPCS | Performed by: INTERNAL MEDICINE

## 2019-03-20 PROCEDURE — 25010000002 HEPARIN (PORCINE) PER 1000 UNITS: Performed by: INTERNAL MEDICINE

## 2019-03-20 PROCEDURE — 0 IOPAMIDOL PER 1 ML: Performed by: INTERNAL MEDICINE

## 2019-03-20 RX ORDER — SODIUM CHLORIDE 9 MG/ML
100 INJECTION, SOLUTION INTRAVENOUS CONTINUOUS
Status: DISPENSED | OUTPATIENT
Start: 2019-03-20 | End: 2019-03-20

## 2019-03-20 RX ORDER — MIDAZOLAM HYDROCHLORIDE 1 MG/ML
INJECTION INTRAMUSCULAR; INTRAVENOUS AS NEEDED
Status: DISCONTINUED | OUTPATIENT
Start: 2019-03-20 | End: 2019-03-20 | Stop reason: HOSPADM

## 2019-03-20 RX ORDER — DIPHENHYDRAMINE HYDROCHLORIDE 50 MG/ML
INJECTION INTRAMUSCULAR; INTRAVENOUS AS NEEDED
Status: DISCONTINUED | OUTPATIENT
Start: 2019-03-20 | End: 2019-03-20 | Stop reason: HOSPADM

## 2019-03-20 RX ORDER — LIDOCAINE HYDROCHLORIDE 20 MG/ML
INJECTION, SOLUTION INFILTRATION; PERINEURAL AS NEEDED
Status: DISCONTINUED | OUTPATIENT
Start: 2019-03-20 | End: 2019-03-20 | Stop reason: HOSPADM

## 2019-03-20 RX ORDER — SODIUM CHLORIDE 9 MG/ML
75 INJECTION, SOLUTION INTRAVENOUS CONTINUOUS
Status: DISCONTINUED | OUTPATIENT
Start: 2019-03-20 | End: 2019-03-20 | Stop reason: HOSPADM

## 2019-03-20 RX ORDER — SODIUM CHLORIDE 0.9 % (FLUSH) 0.9 %
3 SYRINGE (ML) INJECTION EVERY 12 HOURS SCHEDULED
Status: DISCONTINUED | OUTPATIENT
Start: 2019-03-20 | End: 2019-03-20 | Stop reason: HOSPADM

## 2019-03-20 RX ORDER — FENTANYL CITRATE 50 UG/ML
INJECTION, SOLUTION INTRAMUSCULAR; INTRAVENOUS AS NEEDED
Status: DISCONTINUED | OUTPATIENT
Start: 2019-03-20 | End: 2019-03-20 | Stop reason: HOSPADM

## 2019-03-20 RX ORDER — SODIUM CHLORIDE 0.9 % (FLUSH) 0.9 %
3-10 SYRINGE (ML) INJECTION AS NEEDED
Status: DISCONTINUED | OUTPATIENT
Start: 2019-03-20 | End: 2019-03-20 | Stop reason: HOSPADM

## 2019-03-20 RX ORDER — ACETAMINOPHEN,DIPHENHYDRAMINE HCL 500; 25 MG/1; MG/1
2 TABLET, FILM COATED ORAL NIGHTLY PRN
COMMUNITY

## 2019-03-20 RX ADMIN — SODIUM CHLORIDE 75 ML/HR: 9 INJECTION, SOLUTION INTRAVENOUS at 13:12

## 2019-03-20 NOTE — INTERVAL H&P NOTE
H&P updated. The patient was examined and the following changes are noted:  Stress echo is positive for ischemia. Will need cath to clear for surgery

## 2019-12-10 ENCOUNTER — RESULTS ENCOUNTER (OUTPATIENT)
Dept: UROLOGY | Facility: CLINIC | Age: 64
End: 2019-12-10

## 2019-12-10 DIAGNOSIS — N40.1 BPH WITH URINARY OBSTRUCTION: ICD-10-CM

## 2019-12-10 DIAGNOSIS — N13.8 BPH WITH URINARY OBSTRUCTION: ICD-10-CM

## 2020-03-09 ENCOUNTER — OFFICE VISIT (OUTPATIENT)
Dept: CARDIOLOGY | Facility: CLINIC | Age: 65
End: 2020-03-09

## 2020-03-09 VITALS
HEART RATE: 46 BPM | OXYGEN SATURATION: 98 % | BODY MASS INDEX: 42.59 KG/M2 | DIASTOLIC BLOOD PRESSURE: 90 MMHG | SYSTOLIC BLOOD PRESSURE: 146 MMHG | WEIGHT: 281 LBS | HEIGHT: 68 IN

## 2020-03-09 DIAGNOSIS — I25.10 CORONARY ARTERY DISEASE INVOLVING NATIVE CORONARY ARTERY OF NATIVE HEART WITHOUT ANGINA PECTORIS: Primary | ICD-10-CM

## 2020-03-09 DIAGNOSIS — I10 ESSENTIAL HYPERTENSION: ICD-10-CM

## 2020-03-09 DIAGNOSIS — E78.2 MIXED HYPERLIPIDEMIA: ICD-10-CM

## 2020-03-09 PROCEDURE — 99214 OFFICE O/P EST MOD 30 MIN: CPT | Performed by: INTERNAL MEDICINE

## 2020-03-09 PROCEDURE — 93000 ELECTROCARDIOGRAM COMPLETE: CPT | Performed by: INTERNAL MEDICINE

## 2020-03-09 RX ORDER — PRASUGREL 10 MG/1
TABLET, FILM COATED ORAL
COMMUNITY
End: 2021-03-08

## 2020-03-09 RX ORDER — DICLOFENAC SODIUM 75 MG/1
75 TABLET, DELAYED RELEASE ORAL 2 TIMES DAILY
COMMUNITY
End: 2021-03-08 | Stop reason: ALTCHOICE

## 2020-03-09 NOTE — PROGRESS NOTES
Referring Provider: Yobani Aguillon MD    Reason for Follow-up Visit: CAD    Subjective .   Chief Complaint:   Chief Complaint   Patient presents with   • Follow-up     yearly   • Coronary Artery Disease     pt states he has been doing well with no complaints of symptoms.  he is s/p heart cath 3/20/19   • Hypertension     pt states his bp at home is usually normal. he was concerned that it was high today.   • Hyperlipidemia     pcp was closed so unable to obtain the labs.  pt states he had lab done in Nov 2019.  he is on Lipitor 20 mg       History of present illness:  Caron Newman is a 64 y.o. yo male with history of CAD, s/p PRISCILLA in the past with cath last year revealing a small distal total occlusion of the RPDA. He denies any CP        History  Past Medical History:   Diagnosis Date   • CAD (coronary artery disease)    • Cancer (CMS/HCC)     skin   • Hyperchloremia    • Hyperlipidemia    • Hypertension    • Myocardial infarction (CMS/HCC)    • Peripheral vascular disease (CMS/HCC)    • Retention, urine    • S/P coronary artery stent placement     right side   • Sleep apnea    ,   Past Surgical History:   Procedure Laterality Date   • ANKLE ARTHROPLASTY     • BACK SURGERY  03/2018    L4-L5   • CARDIAC CATHETERIZATION     • CARDIAC CATHETERIZATION N/A 3/20/2019    Procedure: Left Heart Cath;  Surgeon: Marcio Helton MD;  Location:  [facility];  Service: Cardiology   • CORONARY STENT PLACEMENT     • FOOT SURGERY Left    • HERNIA REPAIR     • REPLACEMENT TOTAL KNEE     • SHOULDER SURGERY     ,   Family History   Problem Relation Age of Onset   • Hypertension Father    • Cancer Father    • Cancer Mother    • Hypertension Mother    • Immunodeficiency Sister    • Hypertension Brother    ,   Social History     Tobacco Use   • Smoking status: Never Smoker   • Smokeless tobacco: Never Used   Substance Use Topics   • Alcohol use: Never     Frequency: Never   • Drug use: Never   ,      Medications  Current Outpatient Medications   Medication Sig Dispense Refill   • amLODIPine (NORVASC) 10 MG tablet Take 10 mg by mouth Daily. Pt takes 0.5 tablet.     • aspirin 81 MG EC tablet Take 81 mg by mouth Daily.     • atorvastatin (LIPITOR) 20 MG tablet TAKE 1 TABLET EVERY NIGHT AT BEDTIME 90 tablet 3   • carvedilol (COREG) 25 MG tablet Take 25 mg by mouth 2 (Two) Times a Day With Meals.     • diclofenac (VOLTAREN) 75 MG EC tablet Take 75 mg by mouth 2 (Two) Times a Day.     • diphenhydrAMINE-acetaminophen (TYLENOL PM)  MG tablet per tablet Take 2 tablets by mouth At Night As Needed for Sleep.     • furosemide (LASIX) 20 MG tablet TAKE 1 TABLET DAILY 90 tablet 3   • lisinopril-hydrochlorothiazide (PRINZIDE,ZESTORETIC) 20-25 MG per tablet Take 1 tablet by mouth Daily.     • magnesium gluconate (MAGONATE) 500 MG tablet Take 500 mg by mouth 2 (Two) Times a Day.     • Multiple Vitamin (MULTI-VITAMIN DAILY PO) Take  by mouth.     • Omega-3 Fatty Acids (FISH OIL) 1000 MG capsule capsule Take  by mouth 2 (Two) Times a Day With Meals.     • potassium chloride (K-DUR,KLOR-CON) 10 MEQ CR tablet TAKE 1 TABLET DAILY 90 tablet 3   • tamsulosin (FLOMAX) 0.4 MG capsule 24 hr capsule Take 1 capsule by mouth Every Night. 90 capsule 3   • 5-HYDROXYTRYPTOPHAN PO Take 2 tablets by mouth Every Night.     • cholecalciferol (VITAMIN D3) 1000 units tablet Take 1,000 Units by mouth Daily.     • CHROMIUM PO Take  by mouth.     • IODINE-VITAMIN A PO Take 6.25 mg by mouth Daily.     • metFORMIN ER (GLUCOPHAGE-XR) 500 MG 24 hr tablet Take 500 mg by mouth Daily With Breakfast.     • prasugrel (EFFIENT) 10 MG tablet Effient 10 mg tablet       No current facility-administered medications for this visit.        Allergies:  Ciprofloxacin    Review of Systems  Review of Systems   HENT: Negative for nosebleeds.    Cardiovascular: Negative for chest pain, claudication, dyspnea on exertion, irregular heartbeat, leg swelling,  "near-syncope, orthopnea, palpitations, paroxysmal nocturnal dyspnea and syncope.   Respiratory: Negative for cough, hemoptysis and shortness of breath.    Gastrointestinal: Negative for dysphagia, hematemesis and melena.   Genitourinary: Negative for hematuria.   All other systems reviewed and are negative.      Objective     Physical Exam:  /90   Pulse (!) 46   Ht 172.7 cm (68\")   Wt 127 kg (281 lb)   SpO2 98%   BMI 42.73 kg/m²   Physical Exam   Constitutional: He is oriented to person, place, and time. He appears well-nourished. No distress.   HENT:   Head: Normocephalic.   Eyes: No scleral icterus.   Neck: Normal range of motion. Neck supple.   Cardiovascular: Normal rate, regular rhythm and normal heart sounds. Exam reveals no gallop and no friction rub.   No murmur heard.  Pulmonary/Chest: Effort normal and breath sounds normal. No respiratory distress. He has no wheezes. He has no rales.   Abdominal: Soft. Bowel sounds are normal. He exhibits no distension. There is no tenderness.   Musculoskeletal: He exhibits no edema.   Neurological: He is alert and oriented to person, place, and time.   Skin: Skin is warm and dry. He is not diaphoretic. No erythema.   Psychiatric: He has a normal mood and affect. His behavior is normal.       Results Review:    ECG 12 Lead  Date/Time: 3/9/2020 11:14 AM  Performed by: Marcio Helton MD  Authorized by: Marcio Helton MD   Comparison: compared with previous ECG   Rhythm: sinus rhythm  Rate: normal  Conduction: conduction normal  ST Segments: ST segments normal  T Waves: T waves normal  QRS axis: normal    Clinical impression: normal ECG            Admission on 03/20/2019, Discharged on 03/20/2019   Component Date Value Ref Range Status   • Glucose 03/20/2019 102* 70 - 100 mg/dL Final   • BUN 03/20/2019 23* 5 - 21 mg/dL Final   • Creatinine 03/20/2019 0.86  0.50 - 1.40 mg/dL Final   • Sodium 03/20/2019 139  135 - 145 mmol/L Final   • Potassium 03/20/2019 4.2  3.5 - " 5.3 mmol/L Final   • Chloride 03/20/2019 101  98 - 110 mmol/L Final   • CO2 03/20/2019 30.0  24.0 - 31.0 mmol/L Final   • Calcium 03/20/2019 8.9  8.4 - 10.4 mg/dL Final   • eGFR Non African Amer 03/20/2019 89  >60 mL/min/1.73 Final   • BUN/Creatinine Ratio 03/20/2019 26.7* 7.0 - 25.0 Final   • Anion Gap 03/20/2019 8.0  4.0 - 13.0 mmol/L Final   • Protime 03/20/2019 14.0  11.9 - 14.6 Seconds Final   • INR 03/20/2019 1.05  0.91 - 1.09 Final   • WBC 03/20/2019 5.99  4.80 - 10.80 10*3/mm3 Final   • RBC 03/20/2019 4.55* 4.80 - 5.90 10*6/mm3 Final   • Hemoglobin 03/20/2019 13.9* 14.0 - 18.0 g/dL Final   • Hematocrit 03/20/2019 40.3  40.0 - 52.0 % Final   • MCV 03/20/2019 88.6  82.0 - 95.0 fL Final   • MCH 03/20/2019 30.5  28.0 - 32.0 pg Final   • MCHC 03/20/2019 34.5  33.0 - 36.0 g/dL Final   • RDW 03/20/2019 13.3  12.0 - 15.0 % Final   • RDW-SD 03/20/2019 43.1  40.0 - 54.0 fl Final   • MPV 03/20/2019 8.8  6.0 - 12.0 fL Final   • Platelets 03/20/2019 222  130 - 400 10*3/mm3 Final   • Neutrophil % 03/20/2019 67.9  39.0 - 78.0 % Final   • Lymphocyte % 03/20/2019 17.7  15.0 - 45.0 % Final   • Monocyte % 03/20/2019 10.4  4.0 - 12.0 % Final   • Eosinophil % 03/20/2019 3.2  0.0 - 4.0 % Final   • Basophil % 03/20/2019 0.5  0.0 - 2.0 % Final   • Immature Grans % 03/20/2019 0.3  0.0 - 5.0 % Final   • Neutrophils, Absolute 03/20/2019 4.07  1.87 - 8.40 10*3/mm3 Final   • Lymphocytes, Absolute 03/20/2019 1.06  0.72 - 4.86 10*3/mm3 Final   • Monocytes, Absolute 03/20/2019 0.62  0.19 - 1.30 10*3/mm3 Final   • Eosinophils, Absolute 03/20/2019 0.19  0.00 - 0.70 10*3/mm3 Final   • Basophils, Absolute 03/20/2019 0.03  0.00 - 0.20 10*3/mm3 Final   • Immature Grans, Absolute 03/20/2019 0.02  0.00 - 0.05 10*3/mm3 Final   • nRBC 03/20/2019 0.0  0.0 - 0.0 /100 WBC Final       Assessment/Plan   Rexal was seen today for follow-up, coronary artery disease, hypertension and hyperlipidemia.    Diagnoses and all orders for this visit:    Coronary  artery disease involving native coronary artery of native heart without angina pectoris, The patient denies chest pain, shortness of breath, dyspnea on exertion, orthopnea, PND, edema. There is no evidence of ongoing ischemia    Essential hypertension, runs better at home    Mixed hyperlipidemia, Patient's statin therapy is followed by PCP.    Sleep apnea on CPAP    Patient's Body mass index is 42.73 kg/m². BMI is above normal parameters. Recommendations include: exercise counseling.

## 2020-06-24 ENCOUNTER — OFFICE VISIT (OUTPATIENT)
Dept: VASCULAR SURGERY | Age: 65
End: 2020-06-24
Payer: COMMERCIAL

## 2020-06-24 VITALS
RESPIRATION RATE: 18 BRPM | TEMPERATURE: 96.9 F | DIASTOLIC BLOOD PRESSURE: 72 MMHG | SYSTOLIC BLOOD PRESSURE: 133 MMHG | OXYGEN SATURATION: 92 % | HEART RATE: 52 BPM

## 2020-06-24 PROCEDURE — 99203 OFFICE O/P NEW LOW 30 MIN: CPT | Performed by: PHYSICIAN ASSISTANT

## 2020-06-24 NOTE — PROGRESS NOTES
Patient Care Team:  Yoni Nelson as PCP - General      History and Physical:    Mr. Rainell Baumgarten is a 76 yo male who presents today as a referral from [facility]  for evaluation of possible CVI. He reports that he had his right ankle fused in the 2010. He reports that he  Had the pins in his right ankle removed in 2011. He has been seeing Dr. Kallie Yang at [facility] for a non healing wound right lateral ankle. He does not have a history of spontaneous rupture. He denies any history of DVT or SVT. He had a BLE venous scan done for insufficiency. This showed reflux in the left LSV and reflux in the right GSV. Differential diagnosis for leg pain includes but is not limited to: varicose veins, peripheral vascular disease, arthritic pain, DVT, lumbar disc disease, and neurogenic pain.     Tamiko Ross is a 75 y.o. male with the following history reviewed and recorded in [facility]:  Patient Active Problem List    Diagnosis Date Noted    DDD (degenerative disc disease), lumbar 03/13/2018    Spondylolisthesis at L4-L5 level 03/13/2018    Chronic bilateral low back pain without sciatica 03/13/2018     Current Outpatient Medications   Medication Sig Dispense Refill    DICLOFENAC PO Take by mouth      lisinopril-hydrochlorothiazide (PRINZIDE;ZESTORETIC) 20-25 MG per tablet Take 1 tablet by mouth daily      furosemide (LASIX) 20 MG tablet Take 20 mg by mouth nightly      potassium citrate (UROCIT-K) 10 MEQ (1080 MG) extended release tablet Take 10 mEq by mouth daily      magnesium gluconate (MAGONATE) 500 MG tablet Take 1,000 mg by mouth nightly      carvedilol (COREG) 25 MG tablet Take 25 mg by mouth 2 times daily (with meals)      Omega-3 Fatty Acids (FISH OIL) 1000 MG CAPS Take 1,000 mg by mouth 2 times daily       Multiple Vitamins-Minerals (THERAPEUTIC MULTIVITAMIN-MINERALS) tablet Take 1 tablet by mouth daily      atorvastatin (LIPITOR) 20 MG tablet Take 20 mg by mouth nightly       aspirin 81 MG tablet Take 81 mg by mouth daily      tamsulosin (FLOMAX) 0.4 MG capsule Take 0.4 mg by mouth nightly       amLODIPine (NORVASC) 5 MG tablet Take 5 mg by mouth daily       tiZANidine (ZANAFLEX) 4 MG tablet Take 1 tablet by mouth 3 times daily (Patient not taking: Reported on 6/24/2020) 40 tablet 1    metFORMIN (GLUCOPHAGE-XR) 500 MG extended release tablet Take 500 mg by mouth daily (with breakfast)      IODINE STRONG EX Apply topically daily      5-Hydroxytryptophan 100 MG CAPS Take by mouth 2 times daily 2 IN THE AM AND 1 AT HS      Chromium Picolinate 400 MCG TABS Take by mouth 2 times daily      diphenhydrAMINE (BENADRYL) 25 MG tablet Take 25 mg by mouth nightly as needed for Itching      traMADol (ULTRAM) 50 MG tablet Take 50 mg by mouth every 6 hours as needed for Pain.  acetaminophen (TYLENOL) 500 MG tablet Take 1,000 mg by mouth nightly       No current facility-administered medications for this visit.       Allergies: Ciprofloxacin  Past Medical History:   Diagnosis Date    CAD (coronary artery disease) 10/2014    STENT X 1    DDD (degenerative disc disease), lumbar 3/13/2018    Enlarged prostate     Hyperlipidemia     Hypertension     Spondylolisthesis at L4-L5 level 3/13/2018     Past Surgical History:   Procedure Laterality Date    ANKLE FUSION Right 2011    CARDIAC SURGERY  2014    angioplasty with stents    HERNIA REPAIR      JOINT REPLACEMENT Left 2016    knee    MO ARTHDSIS POST/POSTEROLATRL/POSTINTERBODY LUMBAR N/A 3/13/2018    POSTEIROR SPINAL FUSION WITH INSTRUMENTATION performed by Bozena Guerra MD at [address] [facility] Left 3/13/2018    L4-5 LLIF performed by Bozena Guerra MD at [address] [facility] DX/THER SBST EPIDURAL/SUBARACH LUMBAR/SACRAL N/A 6/6/2017    LUMBAR INTER LAMINAR SELWYN L4-5 #1 performed by Damon Terry at [address] DX/THER SBST EPIDURAL/SUBARACH LUMBAR/SACRAL N/A 9/5/2017    L4-5 EPIDURAL STEROID INJECTION performed by Mio Jung at [address] [facility] OR     Family History   Problem Relation Age of Onset    Heart Attack Mother     High Blood Pressure Mother     High Blood Pressure Father      Social History     Tobacco Use    Smoking status: Never Smoker    Smokeless tobacco: Never Used   Substance Use Topics    Alcohol use: No       Old records obtained from the referring provider. These records have been reviewed and summarized. Review of Systems    Constitutional - no significant activity change, appetite change, or unexpected weight change. No fever or chills. No diaphoresis or significant fatigue. HENT - no significant rhinorrhea or epistaxis. No tinnitus or significant hearing loss. Eyes - no sudden vision change or amaurosis. Respiratory - no significant shortness of breath, wheezing, or stridor. No apnea, cough, or chest tightness associated with shortness of breath. Cardiovascular - no chest pain, syncope, or significant dizziness. No palpitations. He reports leg swelling. No claudication. Gastrointestinal - no abdominal swelling or pain. No blood in stool. No severe constipation, diarrhea, nausea, or vomiting. Genitourinary - No difficulty urinating, dysuria, frequency, or urgency. No flank pain or hematuria. Musculoskeletal - no back pain, gait disturbance, or myalgia. Skin - no color change, rash, pallor. Scab area  Over the right lateral ankle. Neurologic - no dizziness, facial asymmetry, or light headedness. No seizures. No speech difficulty or lateralizing weakness. Hematologic - no easy bruising or excessive bleeding. Psychiatric - no severe anxiety or nervousness. No confusion. All other review of systems are negative. Physical Exam    /72 Comment: right  Pulse 52   Temp 96.9 °F (36.1 °C)   Resp 18   SpO2 92%     Constitutional - well developed, well nourished. No diaphoresis or acute distress. HENT - head normocephalic.   Right external ear canal appears normal.  Left external ear canal appears normal.  Septum appears midline. Eyes - conjunctiva normal.  EOMS normal.  No exudate. No icterus. Neck- ROM appears normal, no tracheal deviation. Cardiovascular - Regular rate and rhythm. Heart sounds are normal.  No murmur, rub, or gallop. Carotid pulses are 2+ to palpation bilaterally without bruit. Extremities - Radial and brachial pulses are 2+ to palpation bilaterally. DP and PT pulses with 2+ DS. No cyanosis or clubbing. No signs atheroembolic event. He has  prominent varicosities bilaterally. He has [address] noted. He has a small scabbed area noted over the right lateral ankle. Pulmonary - effort appears normal.  No respiratory distress. Lungs - Breath sounds normal. No wheezes or rales. GI - Abdomen - soft, non tender, bowel sounds X 4 quadrants. No guarding or rebound tenderness. No distension or palpable mass. Genitourinary - deferred. Musculoskeletal - ROM appears normal.  Neurologic - alert and oriented X 3. Physiologic. Skin - warm, dry, and intact. No rash, erythema, or pallor. Psychiatric - mood, affect, and behavior appear normal.  Judgment and thought processes appear normal.      Assessment      1. Venous insufficiency of both lower extremities    2. Leg swelling    3. Wound of right ankle, initial encounter          Plan      Recommend Nsaids for pain control  Support hose  20-30 mmHg compression (script given)  Recommend leg elevation above the level of the heart  Good moisturization  Good skin care  Diet and exercise daily  Vein packet given to the patient  I will discuss the case with Dr. Polo Alcantara. After she has reviewed the imaging, we will notify the patient with further recommendations. Addendum:  Dr Polo Alcantara recommends proceeding with RFA right GSV for insufficiency and non healing wound right lateral ankle.  We will submit to insurance and plan to proceed pending Insurance approval

## 2020-11-06 ENCOUNTER — OFFICE VISIT (OUTPATIENT)
Age: 65
End: 2020-11-06

## 2020-11-06 VITALS — TEMPERATURE: 98 F | HEART RATE: 60 BPM | OXYGEN SATURATION: 94 %

## 2020-11-06 LAB — SARS-COV-2, PCR: NOT DETECTED

## 2020-11-06 PROCEDURE — 99999 PR OFFICE/OUTPT VISIT,PROCEDURE ONLY: CPT | Performed by: NURSE PRACTITIONER

## 2020-11-09 RX ORDER — DIAZEPAM 5 MG/1
TABLET ORAL
Qty: 1 TABLET | Refills: 0 | OUTPATIENT
Start: 2020-11-11 | End: 2020-11-11

## 2020-11-11 ENCOUNTER — OFFICE VISIT (OUTPATIENT)
Dept: VASCULAR SURGERY | Age: 65
End: 2020-11-11
Payer: COMMERCIAL

## 2020-11-11 ENCOUNTER — TELEPHONE (OUTPATIENT)
Dept: VASCULAR SURGERY | Age: 65
End: 2020-11-11

## 2020-11-11 VITALS
WEIGHT: 250 LBS | OXYGEN SATURATION: 97 % | BODY MASS INDEX: 37.89 KG/M2 | HEART RATE: 55 BPM | TEMPERATURE: 98.2 F | DIASTOLIC BLOOD PRESSURE: 78 MMHG | HEIGHT: 68 IN | SYSTOLIC BLOOD PRESSURE: 130 MMHG

## 2020-11-11 PROCEDURE — 36475 ENDOVENOUS RF 1ST VEIN: CPT | Performed by: SURGERY

## 2020-11-11 NOTE — PROGRESS NOTES
RADIOFREQUENCY VEIN ABLATION    DATE OF PROCEDURE: 11/11/2020    PRE-PROCEDURE DIAGNOSIS:    1. Reflux and insufficiency of the right greater saphenous vein  2. Symptomatic pain, aching and wound       POST PROCEDURE DIAGNOSIS:  Same    PROCEDURE:  1. Ultrasound guided cannulation of right greater saphenous vein  2.  Radiofrequency ablation of right greater saphenous vein     ANESTHETIC:    1.  1% lidocaine without epinephrine for placing sheath   2. Tumescent anesthetic solution along the saphenous vein from sheath insertion site to the origin of saphenous vein radiofrequency catheter and saphenofemoral  junction    SURGEON:  Sheridan Castillo, DO    PROCEDURE IN DETAIL:      After the risks, benefits, and alternatives of the procedure were explained to the patient, including how the procedure would be performed, the patient signed an informed consent. The patient's leg was prepped and draped in the standard surgical fashion. Approximately 3-4 mL of 1% lidocaine was injected into the skin and subcutaneous tissues over the saphenous vein insertion site. The right Greater saphenous vein was then cannulated utilizing ultrasound guidance with a micropuncture needle and .018\" wire was placed through the needle. The needle was removed and replaced with a 7F sheath. The radiofrequency catheter was placed through the 7f sheath and under ultrasound guidance, the tip was directed to 2 cm from the saphenofemoral  junction. Next, under ultrasound guidance, tumescent anesthetic solution was injected around the saphenous vein to create a halo of anesthesia along the entire vein from the sheath insertion site to the saphenofemoral  junction. The tip of the catheter was again visualized to ensure position remains 2 cm from the saphenofemoral  junction. The radiofrequency ablation then commensed while gentle pressure on the ultrasound probe/saphenous vein was applied.   A total of 10 treatment cycles was then

## 2020-11-11 NOTE — TELEPHONE ENCOUNTER
The pt's spouse called to notify our office once the pt returned home his dressings were soak through from a R GSV RFA. I walked Hurleyangelica Garciaon through removing the lower ACE wrap and asked if the puncture site had stopped bleeding. She confirmed that it had. I advised Jadon Hidalgokinson to wash the area and to re-dress the leg with a clean ACE wrap. I explained to her when wrapping she will need to start at the base of the toes and wrap in a diagonal angle. The pt was instructed to elevate his leg above heart level for at least 30 mins. Follow all post procedure instructions that were provided after the visit. Callback with any worsening changes. Pt's spouse voiced understanding and is aware.

## 2020-11-12 ENCOUNTER — TELEPHONE (OUTPATIENT)
Dept: VASCULAR SURGERY | Age: 65
End: 2020-11-12

## 2020-11-12 NOTE — TELEPHONE ENCOUNTER
Called patient to remind him of his test on 11-13-20 and his VV due to Covid-19 with [facility] on 11-16-20-he voiced understanding. [address] [facility] [address]

## 2020-11-13 ENCOUNTER — HOSPITAL ENCOUNTER (OUTPATIENT)
Dept: VASCULAR LAB | Age: 65
Discharge: HOME | End: 2020-11-13
Payer: MEDICARE

## 2020-11-13 PROCEDURE — 93971 EXTREMITY STUDY: CPT

## 2020-11-16 ENCOUNTER — VIRTUAL VISIT (OUTPATIENT)
Dept: VASCULAR SURGERY | Age: 65
End: 2020-11-16
Payer: COMMERCIAL

## 2020-11-16 PROCEDURE — 99212 OFFICE O/P EST SF 10 MIN: CPT | Performed by: PHYSICIAN ASSISTANT

## 2020-11-16 NOTE — PROGRESS NOTES
 acetaminophen (TYLENOL) 500 MG tablet Take 1,000 mg by mouth nightly      carvedilol (COREG) 25 MG tablet Take 25 mg by mouth 2 times daily (with meals)      Omega-3 Fatty Acids (FISH OIL) 1000 MG CAPS Take 1,000 mg by mouth 2 times daily       Multiple Vitamins-Minerals (THERAPEUTIC MULTIVITAMIN-MINERALS) tablet Take 1 tablet by mouth daily      atorvastatin (LIPITOR) 20 MG tablet Take 20 mg by mouth nightly       aspirin 81 MG tablet Take 81 mg by mouth daily      tamsulosin (FLOMAX) 0.4 MG capsule Take 0.4 mg by mouth nightly       amLODIPine (NORVASC) 5 MG tablet Take 5 mg by mouth daily        No current facility-administered medications for this visit.       Allergies: Ciprofloxacin  Past Medical History:   Diagnosis Date    CAD (coronary artery disease) 10/2014    STENT X 1    DDD (degenerative disc disease), lumbar 3/13/2018    Enlarged prostate     Hyperlipidemia     Hypertension     Spondylolisthesis at L4-L5 level 3/13/2018     Past Surgical History:   Procedure Laterality Date    ANKLE FUSION Right 2011    CARDIAC SURGERY  2014    angioplasty with stents    HERNIA REPAIR      JOINT REPLACEMENT Left 2016    knee    AL ARTHDSIS POST/POSTEROLATRL/POSTINTERBODY LUMBAR N/A 3/13/2018    POSTEIROR SPINAL FUSION WITH INSTRUMENTATION performed by Rukhsana Rios MD at [address] [facility] Left 3/13/2018    L4-5 LLIF performed by Rukhsana Rios MD at [address] [place] DX/THER SBST EPIDURAL/SUBARACH LUMBAR/SACRAL N/A 6/6/2017    LUMBAR INTER LAMINAR SELWYN L4-5 #1 performed by Evelyn Allen at [address] DX/THER SBST EPIDURAL/SUBARACH LUMBAR/SACRAL N/A 9/5/2017    L4-5 EPIDURAL STEROID INJECTION performed by Evelyn Allen at [facility]     Family History   Problem Relation Age of Onset    Heart Attack Mother     High Blood Pressure Mother     High Blood Pressure Father      Social History     Tobacco Use    Smoking status: Never Smoker    Smokeless tobacco: Never Used   Substance Use Topics    Alcohol use: No       Review of Systems    Constitutional - no significant activity change, appetite change, or unexpected weight change. No fever or chills. No diaphoresis or significant fatigue. HENT - no significant rhinorrhea or epistaxis. No tinnitus or significant hearing loss. Eyes - no sudden vision change or amaurosis. Respiratory - no significant shortness of breath, wheezing, or stridor. No apnea, cough, or chest tightness associated with shortness of breath. Cardiovascular - no chest pain, syncope, or significant dizziness. No palpitations or significant leg swelling.  (see HPI)  Gastrointestinal - no abdominal swelling or pain. No blood in stool. No severe constipation, diarrhea, nausea, or vomiting. Genitourinary - No difficulty urinating, dysuria, frequency, or urgency. No flank pain or hematuria. Musculoskeletal - no back pain, gait disturbance, or myalgia. Skin - no color change, rash, pallor, or new wound. Neurologic - no dizziness, facial asymmetry, or light headedness. No seizures. No speech difficulty or lateralizing weakness. Hematologic - no easy bruising or excessive bleeding. Psychiatric - no severe anxiety or nervousness. No confusion. All other review of systems are negative. PHYSICAL EXAMINATION:  [ INSTRUCTIONS:  \"[x]\" Indicates a positive item  \"[]\" Indicates a negative item  -- DELETE ALL ITEMS NOT EXAMINED]    Constitutional: [x] Appears well-developed and well-nourished [x] No apparent distress      [] Abnormal-   Mental status  [x] Alert and awake  [x] Oriented to person/place/time [x]Able to follow commands      Eyes:  EOM    [x]  Normal  [] Abnormal-  Sclera  []  Normal  [] Abnormal -         Discharge [x]  None visible  [] Abnormal -    HENT:   [x] Normocephalic, atraumatic.   [] Abnormal   [x] Mouth/Throat: Mucous membranes are moist.     External Ears [] Normal  [] Abnormal-     Neck: [x] No visualized mass     Pulmonary/Chest: [x] Respiratory effort normal.  [x] No visualized signs of difficulty breathing or respiratory distress        [] Abnormal-      Musculoskeletal:   [] Normal gait with no signs of ataxia         [x] Normal range of motion of neck        [] Abnormal-       Neurological:        [] No Facial Asymmetry (Cranial nerve 7 motor function) (limited exam to video visit)          [] No gaze palsy        [] Abnormal-         Skin:        [x] No significant exanthematous lesions or discoloration noted on facial skin         [] Abnormal-            Psychiatric:       [x] Normal Affect [x] No Hallucinations        [] Abnormal-       Right leg venous scan -       Impression          There is no evidence of deep venous thrombosis (DVT) in the right lower     extremity.     Successful ablation of the greater saphenous vein in the right lower     extremity.          Signature          ----------------------------------------------------------------     Electronically signed by Justin Esqueda MD(Interpreting     physician) on 11/14/2020 12:06 PM     ----------------------------------------------------------------               ASSESSMENT/PLAN:      1. CVI with venous ulcer right LE      Recommend continue compression stockings and elevation daily PRN  Follow up PRN      Leatha Oconnell is a 77 y.o. male being evaluated by a Virtual Visit (video visit) encounter to address concerns as mentioned above. A caregiver was present when appropriate. Due to this being a TeleHealth encounter (During [place] [address] public health emergency), evaluation of the following organ systems was limited: Vitals/Constitutional/EENT/Resp/CV/GI//MS/Neuro/Skin/Heme-Lymph-Imm.   Pursuant to the emergency declaration under the [facility] waiver authority and the [employer] Act, this Virtual Visit was conducted with patient's (and/or legal guardian's) consent, to reduce the patient's risk of exposure to COVID-19 and provide necessary medical care. The patient (and/or legal guardian) has also been advised to contact this office for worsening conditions or problems, and seek emergency medical treatment and/or call 911 if deemed necessary. Patient identification was verified at the start of the visit: Yes      Services were provided through a video synchronous discussion virtually to substitute for in-person clinic visit. Patient and provider were located at their individual homes. --Jordan Agee PA-C on 11/16/2020 at 11:23 AM    An electronic signature was used to authenticate this note.

## 2021-03-08 ENCOUNTER — OFFICE VISIT (OUTPATIENT)
Dept: CARDIOLOGY | Facility: CLINIC | Age: 66
End: 2021-03-08

## 2021-03-08 VITALS
OXYGEN SATURATION: 98 % | BODY MASS INDEX: 42.13 KG/M2 | DIASTOLIC BLOOD PRESSURE: 90 MMHG | HEIGHT: 68 IN | WEIGHT: 278 LBS | SYSTOLIC BLOOD PRESSURE: 150 MMHG | HEART RATE: 65 BPM

## 2021-03-08 DIAGNOSIS — I10 ESSENTIAL HYPERTENSION: ICD-10-CM

## 2021-03-08 DIAGNOSIS — E78.2 MIXED HYPERLIPIDEMIA: ICD-10-CM

## 2021-03-08 DIAGNOSIS — I25.10 CORONARY ARTERY DISEASE INVOLVING NATIVE CORONARY ARTERY OF NATIVE HEART WITHOUT ANGINA PECTORIS: Primary | ICD-10-CM

## 2021-03-08 PROCEDURE — 93000 ELECTROCARDIOGRAM COMPLETE: CPT | Performed by: INTERNAL MEDICINE

## 2021-03-08 PROCEDURE — 99213 OFFICE O/P EST LOW 20 MIN: CPT | Performed by: INTERNAL MEDICINE

## 2021-03-08 RX ORDER — MELOXICAM 15 MG/1
TABLET ORAL DAILY
COMMUNITY
Start: 2021-02-22 | End: 2021-10-07

## 2021-03-08 NOTE — PROGRESS NOTES
Referring Provider: Chai Edwards DO    Reason for Follow-up Visit: CADS    Subjective .   Chief Complaint:   Chief Complaint   Patient presents with   • Follow-up     yearly   • Coronary Artery Disease     pt states he has been doing fine with no complaints of symptoms.   • Hypertension     pt does not check regularly but the last time checked it was good.     • Hyperlipidemia     last labs done 2/3/2021 but a lipid was not done.  he is on Lipitor 20 mg        History of present illness:  Caron Newman is a 65 y.o. yo male with history of CAD with a  of the distal RPL branch. He denies any CP or SOB.        History  Past Medical History:   Diagnosis Date   • CAD (coronary artery disease)    • Cancer (CMS/HCC)     skin   • Hyperchloremia    • Hyperlipidemia    • Hypertension    • Myocardial infarction (CMS/HCC)    • Peripheral vascular disease (CMS/HCC)    • Retention, urine    • S/P coronary artery stent placement     right side   • Sleep apnea    ,   Past Surgical History:   Procedure Laterality Date   • ANKLE ARTHROPLASTY     • BACK SURGERY  03/2018    L4-L5   • CARDIAC CATHETERIZATION     • CARDIAC CATHETERIZATION N/A 3/20/2019    Procedure: Left Heart Cath;  Surgeon: Marcio Helton MD;  Location:  [facility];  Service: Cardiology   • CORONARY STENT PLACEMENT     • FOOT SURGERY Left    • HERNIA REPAIR     • REPLACEMENT TOTAL KNEE     • SHOULDER SURGERY     ,   Family History   Problem Relation Age of Onset   • Hypertension Father    • Cancer Father    • Cancer Mother    • Hypertension Mother    • Immunodeficiency Sister    • Hypertension Brother    ,   Social History     Tobacco Use   • Smoking status: Never Smoker   • Smokeless tobacco: Never Used   Vaping Use   • Vaping Use: Never used   Substance Use Topics   • Alcohol use: Never   • Drug use: Never   ,     Medications  Current Outpatient Medications   Medication Sig Dispense Refill   • amLODIPine (NORVASC) 5 MG tablet Take 5  "mg by mouth Daily.     • aspirin 81 MG EC tablet Take 81 mg by mouth Daily.     • atorvastatin (LIPITOR) 20 MG tablet TAKE 1 TABLET EVERY NIGHT AT BEDTIME 90 tablet 3   • carvedilol (COREG) 25 MG tablet Take 25 mg by mouth 2 (Two) Times a Day With Meals.     • diphenhydrAMINE-acetaminophen (TYLENOL PM)  MG tablet per tablet Take 2 tablets by mouth At Night As Needed for Sleep.     • furosemide (LASIX) 20 MG tablet TAKE 1 TABLET DAILY 90 tablet 3   • lisinopril-hydrochlorothiazide (PRINZIDE,ZESTORETIC) 20-25 MG per tablet Take 1 tablet by mouth Daily.     • magnesium gluconate (MAGONATE) 500 MG tablet Take 500 mg by mouth 2 (Two) Times a Day.     • meloxicam (MOBIC) 15 MG tablet Daily.     • Multiple Vitamin (MULTI-VITAMIN DAILY PO) Take  by mouth.     • Omega-3 Fatty Acids (FISH OIL) 1000 MG capsule capsule Take  by mouth 2 (Two) Times a Day With Meals.     • potassium chloride (K-DUR,KLOR-CON) 10 MEQ CR tablet TAKE 1 TABLET DAILY 90 tablet 3   • tamsulosin (FLOMAX) 0.4 MG capsule 24 hr capsule Take 1 capsule by mouth Every Night. 90 capsule 3     No current facility-administered medications for this visit.       Allergies:  Ciprofloxacin    Review of Systems  Review of Systems   HENT: Negative for nosebleeds.    Cardiovascular: Negative for chest pain, claudication, dyspnea on exertion, leg swelling, near-syncope, orthopnea, palpitations, paroxysmal nocturnal dyspnea and syncope.   Respiratory: Negative for hemoptysis and shortness of breath.    Gastrointestinal: Negative for melena.   Genitourinary: Negative for hematuria.       Objective     Physical Exam:  /90   Pulse 65   Ht 172.7 cm (68\")   Wt 126 kg (278 lb)   SpO2 98%   BMI 42.27 kg/m²   Pulmonary:      Effort: Pulmonary effort is normal.      Breath sounds: Normal breath sounds.   Cardiovascular:      Normal rate. Regular rhythm.      Murmurs: There is no murmur.         Results Review:    ECG 12 Lead    Date/Time: 3/8/2021 12:25 " PM  Performed by: Marcio Helton MD  Authorized by: Marcio eHlton MD   Comparison: compared with previous ECG   Similar to previous ECG  Rhythm: sinus rhythm  Rate: normal  Conduction: conduction normal  ST Segments: ST segments normal  T Waves: T waves normal  QRS axis: normal  Other findings: poor R wave progression    Clinical impression: non-specific ECG            Admission on 03/20/2019, Discharged on 03/20/2019   Component Date Value Ref Range Status   • Glucose 03/20/2019 102* 70 - 100 mg/dL Final   • BUN 03/20/2019 23* 5 - 21 mg/dL Final   • Creatinine 03/20/2019 0.86  0.50 - 1.40 mg/dL Final   • Sodium 03/20/2019 139  135 - 145 mmol/L Final   • Potassium 03/20/2019 4.2  3.5 - 5.3 mmol/L Final   • Chloride 03/20/2019 101  98 - 110 mmol/L Final   • CO2 03/20/2019 30.0  24.0 - 31.0 mmol/L Final   • Calcium 03/20/2019 8.9  8.4 - 10.4 mg/dL Final   • eGFR Non African Amer 03/20/2019 89  >60 mL/min/1.73 Final   • BUN/Creatinine Ratio 03/20/2019 26.7* 7.0 - 25.0 Final   • Anion Gap 03/20/2019 8.0  4.0 - 13.0 mmol/L Final   • Protime 03/20/2019 14.0  11.9 - 14.6 Seconds Final   • INR 03/20/2019 1.05  0.91 - 1.09 Final   • WBC 03/20/2019 5.99  4.80 - 10.80 10*3/mm3 Final   • RBC 03/20/2019 4.55* 4.80 - 5.90 10*6/mm3 Final   • Hemoglobin 03/20/2019 13.9* 14.0 - 18.0 g/dL Final   • Hematocrit 03/20/2019 40.3  40.0 - 52.0 % Final   • MCV 03/20/2019 88.6  82.0 - 95.0 fL Final   • MCH 03/20/2019 30.5  28.0 - 32.0 pg Final   • MCHC 03/20/2019 34.5  33.0 - 36.0 g/dL Final   • RDW 03/20/2019 13.3  12.0 - 15.0 % Final   • RDW-SD 03/20/2019 43.1  40.0 - 54.0 fl Final   • MPV 03/20/2019 8.8  6.0 - 12.0 fL Final   • Platelets 03/20/2019 222  130 - 400 10*3/mm3 Final   • Neutrophil % 03/20/2019 67.9  39.0 - 78.0 % Final   • Lymphocyte % 03/20/2019 17.7  15.0 - 45.0 % Final   • Monocyte % 03/20/2019 10.4  4.0 - 12.0 % Final   • Eosinophil % 03/20/2019 3.2  0.0 - 4.0 % Final   • Basophil % 03/20/2019 0.5  0.0 - 2.0 % Final   •  Immature Grans % 03/20/2019 0.3  0.0 - 5.0 % Final   • Neutrophils, Absolute 03/20/2019 4.07  1.87 - 8.40 10*3/mm3 Final   • Lymphocytes, Absolute 03/20/2019 1.06  0.72 - 4.86 10*3/mm3 Final   • Monocytes, Absolute 03/20/2019 0.62  0.19 - 1.30 10*3/mm3 Final   • Eosinophils, Absolute 03/20/2019 0.19  0.00 - 0.70 10*3/mm3 Final   • Basophils, Absolute 03/20/2019 0.03  0.00 - 0.20 10*3/mm3 Final   • Immature Grans, Absolute 03/20/2019 0.02  0.00 - 0.05 10*3/mm3 Final   • nRBC 03/20/2019 0.0  0.0 - 0.0 /100 WBC Final       Assessment/Plan   Problem List Items Addressed This Visit        Cardiac and Vasculature    Coronary artery disease involving native coronary artery of native heart without angina pectoris - Primary    Current Assessment & Plan     The patient denies chest pain, shortness of breath, dyspnea on exertion, orthopnea, PND, edema. There is no evidence of ongoing ischemia           Essential hypertension    Current Assessment & Plan     Running high today. Usually runs lower at home         Mixed hyperlipidemia    Current Assessment & Plan     Patient's statin therapy is followed by PCP.                 Medical Complexity  must have 2 out of 3     Moderate Complexity Level 4           1 of the following medical problems:          []One chronic illness with mild exacerbation         [x]Two or more stable chronic illness          []One new problem  []One acute illness with systemic symptoms    Complexity of Data  Reviewed (1 out of the 3 following categories)      Category 1 tests, documents, historian (must have 3 points)       []Review of prior external records  [x]Review of results of unique tests  []Ordering unique tests  []Assessment requires an independent historian    Category 2 Interpretation of tests   n/a    Category 3 Discuss Management/tests  n/a    Risk of complications and/or morbidity          [x]Prescription Drug Management   He has gotten his first covid vaccine

## 2021-09-29 ENCOUNTER — TELEPHONE (OUTPATIENT)
Dept: NEUROSURGERY | Facility: CLINIC | Age: 66
End: 2021-09-29

## 2021-10-07 ENCOUNTER — OFFICE VISIT (OUTPATIENT)
Dept: NEUROSURGERY | Facility: CLINIC | Age: 66
End: 2021-10-07

## 2021-10-07 VITALS
WEIGHT: 273 LBS | BODY MASS INDEX: 41.37 KG/M2 | HEIGHT: 68 IN | DIASTOLIC BLOOD PRESSURE: 78 MMHG | SYSTOLIC BLOOD PRESSURE: 138 MMHG

## 2021-10-07 DIAGNOSIS — M25.552 LEFT HIP PAIN: ICD-10-CM

## 2021-10-07 DIAGNOSIS — Z78.9 NON-SMOKER: ICD-10-CM

## 2021-10-07 DIAGNOSIS — E66.01 CLASS 3 SEVERE OBESITY DUE TO EXCESS CALORIES WITH BODY MASS INDEX (BMI) OF 40.0 TO 44.9 IN ADULT, UNSPECIFIED WHETHER SERIOUS COMORBIDITY PRESENT (HCC): ICD-10-CM

## 2021-10-07 DIAGNOSIS — M51.37 DEGENERATION OF LUMBAR OR LUMBOSACRAL INTERVERTEBRAL DISC: Primary | ICD-10-CM

## 2021-10-07 PROBLEM — M51.379 DEGENERATION OF LUMBAR OR LUMBOSACRAL INTERVERTEBRAL DISC: Status: ACTIVE | Noted: 2021-10-07

## 2021-10-07 PROBLEM — E66.813 CLASS 3 SEVERE OBESITY DUE TO EXCESS CALORIES WITH BODY MASS INDEX (BMI) OF 40.0 TO 44.9 IN ADULT: Status: ACTIVE | Noted: 2021-10-07

## 2021-10-07 PROCEDURE — 99204 OFFICE O/P NEW MOD 45 MIN: CPT | Performed by: NURSE PRACTITIONER

## 2021-10-07 RX ORDER — DICLOFENAC SODIUM AND MISOPROSTOL 75; 200 MG/1; UG/1
1 TABLET, DELAYED RELEASE ORAL 2 TIMES DAILY
COMMUNITY

## 2021-10-07 RX ORDER — GABAPENTIN 300 MG/1
300 CAPSULE ORAL 3 TIMES DAILY
COMMUNITY

## 2021-10-07 NOTE — PATIENT INSTRUCTIONS
"BMI for Adults  What is BMI?  Body mass index (BMI) is a number that is calculated from a person's weight and height. BMI can help estimate how much of a person's weight is composed of fat. BMI does not measure body fat directly. Rather, it is an alternative to procedures that directly measure body fat, which can be difficult and expensive.  BMI can help identify people who may be at higher risk for certain medical problems.  What are BMI measurements used for?  BMI is used as a screening tool to identify possible weight problems. It helps determine whether a person is obese, overweight, a healthy weight, or underweight.  BMI is useful for:  · Identifying a weight problem that may be related to a medical condition or may increase the risk for medical problems.  · Promoting changes, such as changes in diet and exercise, to help reach a healthy weight. BMI screening can be repeated to see if these changes are working.  How is BMI calculated?  BMI involves measuring your weight in relation to your height. Both height and weight are measured, and the BMI is calculated from those numbers. This can be done either in English ([place] or metric measurements. Note that charts and online BMI calculators are available to help you find your BMI quickly and easily without having to do these calculations yourself.  To calculate your BMI in English ([place] measurements:    1. Measure your weight in pounds (lb).  2. Multiply the number of pounds by 703.  ? For example, for a person who weighs 180 lb, multiply that number by 703, which equals 126,540.  3. Measure your height in inches. Then multiply that number by itself to get a measurement called \"inches squared.\"  ? For example, for a person who is 70 inches tall, the \"inches squared\" measurement is 70 inches x 70 inches, which equals 4,900 inches squared.  4. Divide the total from step 2 (number of lb x 703) by the total from step 3 (inches squared): 126,540 ÷ 4,900 = 25.8. This is " "your BMI.    To calculate your BMI in metric measurements:  1. Measure your weight in kilograms (kg).  2. Measure your height in meters (m). Then multiply that number by itself to get a measurement called \"meters squared.\"  ? For example, for a person who is 1.75 m tall, the \"meters squared\" measurement is 1.75 m x 1.75 m, which is equal to 3.1 meters squared.  3. Divide the number of kilograms (your weight) by the meters squared number. In this example: 70 ÷ 3.1 = 22.6. This is your BMI.  What do the results mean?  BMI charts are used to identify whether you are underweight, normal weight, overweight, or obese. The following guidelines will be used:  · Underweight: BMI less than 18.5.  · Normal weight: BMI between 18.5 and 24.9.  · Overweight: BMI between 25 and 29.9.  · Obese: BMI of 30 or above.  Keep these notes in mind:  · Weight includes both fat and muscle, so someone with a muscular build, such as an athlete, may have a BMI that is higher than 24.9. In cases like these, BMI is not an accurate measure of body fat.  · To determine if excess body fat is the cause of a BMI of 25 or higher, further assessments may need to be done by a health care provider.  · BMI is usually interpreted in the same way for men and women.  Where to find more information  For more information about BMI, including tools to quickly calculate your BMI, go to these websites:  · Centers for Disease Control and Prevention: www.cdc.gov  · American Heart Association: www.heart.org  · National Heart, Lung, and Blood [place]: www.nhlbi.nih.gov  Summary  · Body mass index (BMI) is a number that is calculated from a person's weight and height.  · BMI may help estimate how much of a person's weight is composed of fat. BMI can help identify those who may be at higher risk for certain medical problems.  · BMI can be measured using English measurements or metric measurements.  · BMI charts are used to identify whether you are underweight, normal " "weight, overweight, or obese.  This information is not intended to replace advice given to you by your health care provider. Make sure you discuss any questions you have with your health care provider.  Document Revised: 09/09/2020 Document Reviewed: 07/17/2020  [employer] Patient Education © 2021 [employer]      https://www.nhlbi.nih.gov/files/docs/public/heart/dash_brief.pdf\">   DASH Eating Plan  DASH stands for Dietary Approaches to Stop Hypertension. The DASH eating plan is a healthy eating plan that has been shown to:  · Reduce high blood pressure (hypertension).  · Reduce your risk for type 2 diabetes, heart disease, and stroke.  · Help with weight loss.  What are tips for following this plan?  Reading food labels  · Check food labels for the amount of salt (sodium) per serving. Choose foods with less than 5 percent of the Daily Value of sodium. Generally, foods with less than 300 milligrams (mg) of sodium per serving fit into this eating plan.  · To find whole grains, look for the word \"whole\" as the first word in the ingredient list.  Shopping  · Buy products labeled as \"low-sodium\" or \"no salt added.\"  · Buy fresh foods. Avoid canned foods and pre-made or frozen meals.  Cooking  · Avoid adding salt when cooking. Use salt-free seasonings or herbs instead of table salt or sea salt. Check with your health care provider or pharmacist before using salt substitutes.  · Do not whalen foods. Cook foods using healthy methods such as baking, boiling, grilling, roasting, and broiling instead.  · Cook with heart-healthy oils, such as olive, canola, avocado, soybean, or sunflower oil.  Meal planning    · Eat a balanced diet that includes:  ? 4 or more servings of fruits and 4 or more servings of vegetables each day. Try to fill one-half of your plate with fruits and vegetables.  ? 6-8 servings of whole grains each day.  ? Less than 6 oz (170 g) of lean meat, poultry, or fish each day. A 3-oz (85-g) serving of meat is " about the same size as a deck of cards. One egg equals 1 oz (28 g).  ? 2-3 servings of low-fat dairy each day. One serving is 1 cup (237 mL).  ? 1 serving of nuts, seeds, or beans 5 times each week.  ? 2-3 servings of heart-healthy fats. Healthy fats called omega-3 fatty acids are found in foods such as walnuts, flaxseeds, fortified milks, and eggs. These fats are also found in cold-water fish, such as sardines, salmon, and mackerel.  · Limit how much you eat of:  ? Canned or prepackaged foods.  ? Food that is high in trans fat, such as some fried foods.  ? Food that is high in saturated fat, such as fatty meat.  ? Desserts and other sweets, sugary drinks, and other foods with added sugar.  ? Full-fat dairy products.  · Do not salt foods before eating.  · Do not eat more than 4 egg yolks a week.  · Try to eat at least 2 vegetarian meals a week.  · Eat more home-cooked food and less restaurant, buffet, and fast food.    Lifestyle  · When eating at a restaurant, ask that your food be prepared with less salt or no salt, if possible.  · If you drink alcohol:  ? Limit how much you use to:  § 0-1 drink a day for women who are not pregnant.  § 0-2 drinks a day for men.  ? Be aware of how much alcohol is in your drink. In the [place] one drink equals one 12 oz bottle of beer (355 mL), one 5 oz glass of wine (148 mL), or one 1½ oz glass of hard liquor (44 mL).  General information  · Avoid eating more than 2,300 mg of salt a day. If you have hypertension, you may need to reduce your sodium intake to 1,500 mg a day.  · Work with your health care provider to maintain a healthy body weight or to lose weight. Ask what an ideal weight is for you.  · Get at least 30 minutes of exercise that causes your heart to beat faster (aerobic exercise) most days of the week. Activities may include walking, swimming, or biking.  · Work with your health care provider or dietitian to adjust your eating plan to your individual calorie needs.  What  foods should I eat?  Fruits  All fresh, dried, or frozen fruit. Canned fruit in natural juice (without added sugar).  Vegetables  Fresh or frozen vegetables (raw, steamed, roasted, or grilled). Low-sodium or reduced-sodium tomato and vegetable juice. Low-sodium or reduced-sodium tomato sauce and tomato paste. Low-sodium or reduced-sodium canned vegetables.  Grains  Whole-grain or whole-wheat bread. Whole-grain or whole-wheat pasta. Brown rice. Oatmeal. Quinoa. Bulgur. Whole-grain and low-sodium cereals. Teressa bread. Low-fat, low-sodium crackers. Whole-wheat flour tortillas.  Meats and other proteins  Skinless chicken or turkey. Ground chicken or turkey. Pork with fat trimmed off. Fish and seafood. Egg whites. Dried beans, peas, or lentils. Unsalted nuts, nut butters, and seeds. Unsalted canned beans. Lean cuts of beef with fat trimmed off. Low-sodium, lean precooked or cured meat, such as sausages or meat loaves.  Dairy  Low-fat (1%) or fat-free (skim) milk. Reduced-fat, low-fat, or fat-free cheeses. Nonfat, low-sodium ricotta or cottage cheese. Low-fat or nonfat yogurt. Low-fat, low-sodium cheese.  Fats and oils  Soft margarine without trans fats. Vegetable oil. Reduced-fat, low-fat, or light mayonnaise and salad dressings (reduced-sodium). Canola, safflower, olive, avocado, soybean, and sunflower oils. Avocado.  Seasonings and condiments  Herbs. Spices. Seasoning mixes without salt.  Other foods  Unsalted popcorn and pretzels. Fat-free sweets.  The items listed above may not be a complete list of foods and beverages you can eat. Contact a dietitian for more information.  What foods should I avoid?  Fruits  Canned fruit in a light or heavy syrup. Fried fruit. Fruit in cream or butter sauce.  Vegetables  Creamed or fried vegetables. Vegetables in a cheese sauce. Regular canned vegetables (not low-sodium or reduced-sodium). Regular canned tomato sauce and paste (not low-sodium or reduced-sodium). Regular tomato and  vegetable juice (not low-sodium or reduced-sodium). Pickles. Olives.  Grains  Baked goods made with fat, such as croissants, muffins, or some breads. Dry pasta or rice meal packs.  Meats and other proteins  Fatty cuts of meat. Ribs. Fried meat. Leavitt. Bologna, salami, and other precooked or cured meats, such as sausages or meat loaves. Fat from the back of a pig (fatback). Bratwurst. Salted nuts and seeds. Canned beans with added salt. Canned or smoked fish. Whole eggs or egg yolks. Chicken or turkey with skin.  Dairy  Whole or 2% milk, cream, and half-and-half. Whole or full-fat cream cheese. Whole-fat or sweetened yogurt. Full-fat cheese. Nondairy creamers. Whipped toppings. Processed cheese and cheese spreads.  Fats and oils  Butter. Stick margarine. Lard. Shortening. Ghee. [place]avitt fat. Tropical oils, such as coconut, palm kernel, or palm oil.  Seasonings and condiments  Onion salt, garlic salt, seasoned salt, table salt, and sea salt. Worcestershire sauce. Tartar sauce. Barbecue sauce. Teriyaki sauce. Soy sauce, including reduced-sodium. Steak sauce. Canned and packaged gravies. Fish sauce. Oyster sauce. Cocktail sauce. Store-bought horseradish. Ketchup. Mustard. Meat flavorings and tenderizers. Bouillon cubes. Hot sauces. Pre-made or packaged marinades. Pre-made or packaged taco seasonings. Relishes. Regular salad dressings.  Other foods  Salted popcorn and pretzels.  The items listed above may not be a complete list of foods and beverages you should avoid. Contact a dietitian for more information.  Where to find more information  · National Heart, Lung, and Blood [place]: www.nhlbi.nih.gov  · American Heart Association: www.heart.org  · Academy of Nutrition and Dietetics: www.eatright.org  · National Kidney Foundation: www.kidney.org  Summary  · The DASH eating plan is a healthy eating plan that has been shown to reduce high blood pressure (hypertension). It may also reduce your risk for type 2 diabetes, heart  disease, and stroke.  · When on the DASH eating plan, aim to eat more fresh fruits and vegetables, whole grains, lean proteins, low-fat dairy, and heart-healthy fats.  · With the DASH eating plan, you should limit salt (sodium) intake to 2,300 mg a day. If you have hypertension, you may need to reduce your sodium intake to 1,500 mg a day.  · Work with your health care provider or dietitian to adjust your eating plan to your individual calorie needs.  This information is not intended to replace advice given to you by your health care provider. Make sure you discuss any questions you have with your health care provider.  Document Revised: 11/20/2020 Document Reviewed: 11/20/2020  [employer] Patient Education © 2021 [employer]      Advance Care Planning and Advance Directives     You make decisions on a daily basis - decisions about where you want to live, your career, your home, your life. Perhaps one of the most important decisions you face is your choice for future medical care. Take time to talk with your family and your healthcare team and start planning today.  Advance Care Planning is a process that can help you:  · Understand possible future healthcare decisions in light of your own experiences  · Reflect on those decision in light of your goals and values  · Discuss your decisions with those closest to you and the healthcare professionals that care for you  · Make a plan by creating a document that reflects your wishes    Surrogate Decision Maker  In the event of a medical emergency, which has left you unable to communicate or to make your own decisions, you would need someone to make decisions for you.  It is important to discuss your preferences for medical treatment with this person while you are in good health.     Qualities of a surrogate decision maker:  • Willing to take on this role and responsibility  • Knows what you want for future medical care  • Willing to follow your wishes even if they don't  agree with them  • Able to make difficult medical decisions under stressful circumstances    Advance Directives  These are legal documents you can create that will guide your healthcare team and decision maker(s) when needed. These documents can be stored in the electronic medical record.    · Living Will - a legal document to guide your care if you have a terminal condition or a serious illness and are unable to communicate. States vary by statute in document names/types, but most forms may include one or more of the following:        -  Directions regarding life-prolonging treatments        -  Directions regarding artificially provided nutrition/hydration        -  Choosing a healthcare decision maker        -  Direction regarding organ/tissue donation    · Durable Power of  for Healthcare - this document names an -in-fact to make medical decisions for you, but it may also allow this person to make personal and financial decisions for you. Please seek the advice of an  if you need this type of document.    **Advance Directives are not required and no one may discriminate against you if you do not sign one.    Medical Orders  Many states allow specific forms/orders signed by your physician to record your wishes for medical treatment in your current state of health. This form, signed in personal communication with your physician, addresses resuscitation and other medical interventions that you may or may not want.      For more information or to schedule a time with a [facility] Advance Care Planning Facilitator contact: [email or web address] or call [phone] and someone will contact you directly.

## 2021-10-07 NOTE — PROGRESS NOTES
Chief complaint:   Chief Complaint   Patient presents with   • Back Pain     Caron has been referred here today for follow up for his low back pain with left leg pain, he does bring a CT scan and x-ray of the lumbar with him today from [facility], he also brings his EMG/NCV studies from [facility].  He states he has not had any physical therapy.        Subjective     HPI: This is a 65-year-old male gentleman who was referred to us by Dr. Chai Edwards for back pain and left groin pain.  He is here to be evaluated today.  Patient said that he did have surgery in 2018 by Dr. Ole Owens for numbness and tingling in his legs bilaterally where he had a L4-5 lumbar fusion done.  He said he did have improvement in his numbness tingling in his legs from that surgery.  He says that he does have occasional back pain that is intermittent.  It is worse with repetitive movements and better with sitting his main complaint however is pain in his left groin area he says this pain is intermittent.  He said it is worse whenever he is in certain positions and want to change positions to try make it better.  Denies any bowel or bladder incontinence.  He has not done any recent physical therapy or pain management injections.  He has done chiropractic care without any improvement.  He is right-hand dominant.  Is retired.  Denies any tobacco, alcohol, or illicit drug use.  Rates his pain on a scale 0-10 at a 6.  He says it can interfere with his actives of daily living    Review of Systems   Musculoskeletal: Positive for back pain and gait problem.   All other systems reviewed and are negative.       Past Medical History:   Diagnosis Date   • CAD (coronary artery disease)    • Cancer (HCC)     skin   • Hyperchloremia    • Hyperlipidemia    • Hypertension    • Left leg pain    • Low back pain    • Myocardial infarction (HCC)    • Peripheral vascular disease (HCC)    • Retention, urine    • S/P coronary artery stent placement     right side  "  • Sleep apnea      Past Surgical History:   Procedure Laterality Date   • ANKLE ARTHROPLASTY     • BACK SURGERY  03/2018    L4-L5  Dr. Owens   • CARDIAC CATHETERIZATION     • CARDIAC CATHETERIZATION N/A 3/20/2019    Procedure: Left Heart Cath;  Surgeon: Marcio Helton MD;  Location:  [facility] CATH [facility]ASIVE [facility];  Service: Cardiology   • CORONARY STENT PLACEMENT     • FOOT SURGERY Left    • HERNIA REPAIR     • REPLACEMENT TOTAL KNEE     • SHOULDER SURGERY       Family History   Problem Relation Age of Onset   • Hypertension Father    • Cancer Father    • Cancer Mother    • Hypertension Mother    • Immunodeficiency Sister    • Hypertension Brother      Social History     Tobacco Use   • Smoking status: Never Smoker   • Smokeless tobacco: Never Used   Vaping Use   • Vaping Use: Never used   Substance Use Topics   • Alcohol use: Never   • Drug use: Never     (Not in a hospital admission)    Allergies:  Ciprofloxacin    Objective      Vital Signs  /78   Ht 172.7 cm (68\")   Wt 124 kg (273 lb)   BMI 41.51 kg/m²     Physical Exam  Constitutional:       Appearance: Normal appearance. He is well-developed.   HENT:      Head: Normocephalic.   Eyes:      General: Lids are normal.      Extraocular Movements: EOM normal.      Conjunctiva/sclera: Conjunctivae normal.      Pupils: Pupils are equal, round, and reactive to light.   Cardiovascular:      Rate and Rhythm: Normal rate and regular rhythm.   Pulmonary:      Effort: Pulmonary effort is normal.      Breath sounds: Normal breath sounds.   Musculoskeletal:         General: Normal range of motion.      Cervical back: Normal range of motion.      Comments: Back pain   Skin:     General: Skin is warm.   Neurological:      Mental Status: He is alert and oriented to person, place, and time.      GCS: GCS eye subscore is 4. GCS verbal subscore is 5. GCS motor subscore is 6.      Cranial Nerves: No cranial nerve deficit.      Sensory: No sensory deficit.      Gait: Gait is " intact.      Deep Tendon Reflexes: Strength normal and reflexes are normal and symmetric. Reflexes normal.   Psychiatric:         Speech: Speech normal.         Behavior: Behavior normal.         Thought Content: Thought content normal.         Neurologic Exam     Mental Status   Oriented to person, place, and time.   Attention: normal. Concentration: normal.   Speech: speech is normal   Level of consciousness: alert  Normal comprehension.     Cranial Nerves     CN II   Visual fields full to confrontation.     CN III, IV, VI   Pupils are equal, round, and reactive to light.  Extraocular motions are normal.     CN V   Facial sensation intact.     CN VII   Facial expression full, symmetric.     CN VIII   CN VIII normal.     CN IX, X   CN IX normal.   CN X normal.     CN XI   CN XI normal.     CN XII   CN XII normal.     Motor Exam   Muscle bulk: normal    Strength   Strength 5/5 throughout.     Sensory Exam   Light touch normal.     Gait, Coordination, and Reflexes     Gait  Gait: normal    Reflexes   Reflexes 2+ except as noted.       Imaging review: X-ray and CT scan of the lumbar spine that was done at [facility] on August 5, 2021 shows patient had previous fusion L4-5.  There is degeneration noted at L5-S1 with spurring.  There is also degeneration noted at the rest of the levels of the lumbar spine.  There has been a compression deformity of T12 that has autofused to L1.  Multiple areas of spurs are noted throughout as well.        Assessment/Plan: I am going to send the patient for an MRI of the left hip as well as the lumbar spine to further evaluate the pain issues that he is experiencing.  We will have him follow-up with Dr. Troncoso after testing is completed.  He was told to call us if any further problems or concerns would be happy to see him sooner if needed.  Patient is a nonsmoker  The patient's Body mass index is 41.51 kg/m².. BMI is above normal parameters. Recommendations include:  educational material and nutrition counseling  Advance Care Planning   ACP discussion was held with the patient during this visit. Patient does not have an advance directive, information provided.      Diagnoses and all orders for this visit:    1. Degeneration of lumbar or lumbosacral intervertebral disc (Primary)  -     MRI Lumbar Spine Without Contrast; Future    2. Left hip pain  -     MRI Hip Left Without Contrast; Future    3. Class 3 severe obesity due to excess calories with body mass index (BMI) of 40.0 to 44.9 in adult, unspecified whether serious comorbidity present (HCC)    4. Non-smoker          I discussed the patients findings and my recommendations with patient    Ismael Ruiz, APRN  10/07/21  14:31 CDT

## 2021-10-14 ENCOUNTER — TELEPHONE (OUTPATIENT)
Dept: NEUROSURGERY | Facility: CLINIC | Age: 66
End: 2021-10-14

## 2021-10-14 NOTE — TELEPHONE ENCOUNTER
Caller: LIZZIE    Relationship to patient:  [employer]     Best call back number: [phone]    Patient is needing: LIZZIE WITH  [employer] IS CALLING STATING THE PATIENTS INSURANCE HAS DENIED HIS MRI HIP. THEY ARE OFFERING A PEER TO PEER OPTION, THIS WOULD NEED TO BE COMPLETED BY 10/18/21 AND SENT [phone]. PLEASE ADVISE.

## 2021-10-22 ENCOUNTER — HOSPITAL ENCOUNTER (OUTPATIENT)
Dept: MRI IMAGING | Facility: HOSPITAL | Age: 66
Discharge: HOME | End: 2021-10-22
Admitting: NURSE PRACTITIONER

## 2021-10-22 ENCOUNTER — APPOINTMENT (OUTPATIENT)
Dept: MRI IMAGING | Facility: HOSPITAL | Age: 66
End: 2021-10-22

## 2021-10-22 DIAGNOSIS — M51.37 DEGENERATION OF LUMBAR OR LUMBOSACRAL INTERVERTEBRAL DISC: ICD-10-CM

## 2021-10-22 PROCEDURE — 72148 MRI LUMBAR SPINE W/O DYE: CPT

## 2021-10-25 ENCOUNTER — TELEPHONE (OUTPATIENT)
Dept: NEUROSURGERY | Facility: CLINIC | Age: 66
End: 2021-10-25

## 2021-10-25 DIAGNOSIS — M51.37 DEGENERATION OF LUMBAR OR LUMBOSACRAL INTERVERTEBRAL DISC: Primary | ICD-10-CM

## 2021-10-25 DIAGNOSIS — M25.552 LEFT HIP PAIN: ICD-10-CM

## 2021-10-25 NOTE — TELEPHONE ENCOUNTER
Called patient and gave him the results of the imaging of his lumbar spine.  At this point the patient is going start with physical therapy at [facility] and we will also obtain an x-ray of his right hip and will have him go to pain management for further evaluation to see if this will help with the pain he is experiencing.  He is to keep his appointment in January with Dr. Troncoso

## 2021-10-28 ENCOUNTER — HOSPITAL ENCOUNTER (OUTPATIENT)
Dept: GENERAL RADIOLOGY | Facility: HOSPITAL | Age: 66
Discharge: HOME | End: 2021-10-28
Admitting: NURSE PRACTITIONER

## 2021-10-28 DIAGNOSIS — M51.37 DEGENERATION OF LUMBAR OR LUMBOSACRAL INTERVERTEBRAL DISC: ICD-10-CM

## 2021-10-28 DIAGNOSIS — M25.552 LEFT HIP PAIN: ICD-10-CM

## 2021-10-28 PROCEDURE — 73502 X-RAY EXAM HIP UNI 2-3 VIEWS: CPT

## 2021-11-02 ENCOUNTER — TELEPHONE (OUTPATIENT)
Dept: NEUROSURGERY | Facility: CLINIC | Age: 66
End: 2021-11-02

## 2021-11-02 DIAGNOSIS — M25.552 LEFT HIP PAIN: Primary | ICD-10-CM

## 2021-11-02 NOTE — TELEPHONE ENCOUNTER
Called and gave Caron the hip x-ray results and explained he will need to follow with orthopedics, he wants to see Dr. Murali Khan in [place], we will put a referral in for the patient.       He is also asking if he should go ahead with the physical therapy at [facility] or hold off now until after he see Dr. Khan?  Please advise about the therapy and I will call the patient.

## 2021-11-02 NOTE — TELEPHONE ENCOUNTER
Tried to reach patient with his x-ray results and recommendation of referral to orthopaedics, no answer, left message for a call back.

## 2021-11-02 NOTE — TELEPHONE ENCOUNTER
Called patient to let him know that he should hold on the physical therapy until after he sees Dr. Khan, he understands and will hold off on any therapy.

## 2021-11-02 NOTE — TELEPHONE ENCOUNTER
----- Message from TRISTEN Prajapati sent at 10/28/2021  4:02 PM CDT -----  He needs to go see orthopedics.  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

## 2021-12-20 ENCOUNTER — TELEPHONE (OUTPATIENT)
Dept: NEUROSURGERY | Facility: CLINIC | Age: 66
End: 2021-12-20

## 2022-03-14 ENCOUNTER — OFFICE VISIT (OUTPATIENT)
Dept: CARDIOLOGY | Facility: CLINIC | Age: 67
End: 2022-03-14

## 2022-03-14 VITALS
BODY MASS INDEX: 39.86 KG/M2 | HEART RATE: 62 BPM | DIASTOLIC BLOOD PRESSURE: 78 MMHG | SYSTOLIC BLOOD PRESSURE: 128 MMHG | OXYGEN SATURATION: 95 % | HEIGHT: 68 IN | WEIGHT: 263 LBS

## 2022-03-14 DIAGNOSIS — E66.01 CLASS 3 SEVERE OBESITY DUE TO EXCESS CALORIES WITH BODY MASS INDEX (BMI) OF 40.0 TO 44.9 IN ADULT, UNSPECIFIED WHETHER SERIOUS COMORBIDITY PRESENT: ICD-10-CM

## 2022-03-14 DIAGNOSIS — E78.2 MIXED HYPERLIPIDEMIA: ICD-10-CM

## 2022-03-14 DIAGNOSIS — I25.10 CORONARY ARTERY DISEASE INVOLVING NATIVE CORONARY ARTERY OF NATIVE HEART WITHOUT ANGINA PECTORIS: ICD-10-CM

## 2022-03-14 DIAGNOSIS — I10 ESSENTIAL HYPERTENSION: Primary | ICD-10-CM

## 2022-03-14 PROCEDURE — 99214 OFFICE O/P EST MOD 30 MIN: CPT | Performed by: INTERNAL MEDICINE

## 2022-03-14 PROCEDURE — 93000 ELECTROCARDIOGRAM COMPLETE: CPT | Performed by: INTERNAL MEDICINE

## 2022-03-14 RX ORDER — DICLOFENAC SODIUM 75 MG/1
TABLET, DELAYED RELEASE ORAL 2 TIMES DAILY
COMMUNITY
Start: 2022-02-21

## 2022-03-14 NOTE — PROGRESS NOTES
Referring Provider: Chai Edwards DO    Reason for Follow-up Visit: CAD    Subjective .   Chief Complaint:   Chief Complaint   Patient presents with   • Follow-up     Yearly   • Coronary Artery Disease     Pt states he has been doing well with no issues.  He is still re cooperating from hip surgery so his energy level has been lower but is gradually coming back.   • Hypertension     Pt states its been good when checked.   • Hyperlipidemia     Last lab done recently at pcp.  This was requested but not received.   pt is on Lipitor 20 mg       History of present illness:  Caron Newman is a 66 y.o. yo male with a  of the RPDA in for routine follow up. He denies any chest pain. Recently had left hip surgery.       History  Past Medical History:   Diagnosis Date   • CAD (coronary artery disease)    • Cancer (HCC)     skin   • Hyperchloremia    • Hyperlipidemia    • Hypertension    • Left leg pain    • Low back pain    • Myocardial infarction (HCC)    • Peripheral vascular disease (HCC)    • Retention, urine    • S/P coronary artery stent placement     right side   • Sleep apnea    ,   Past Surgical History:   Procedure Laterality Date   • ANKLE ARTHROPLASTY     • BACK SURGERY  03/2018    L4-L5  Dr. Owens   • CARDIAC CATHETERIZATION     • CARDIAC CATHETERIZATION N/A 03/20/2019    Procedure: Left Heart Cath;  Surgeon: Marcio Helton MD;  Location:  [facility] CATH INV[facility] LOC[facility];  Service: Cardiology   • CORONARY STENT PLACEMENT     • FOOT SURGERY Left    • HERNIA REPAIR     • REPLACEMENT TOTAL KNEE     • SHOULDER SURGERY     • TOTAL HIP ARTHROPLASTY Left    ,   Family History   Problem Relation Age of Onset   • Cancer Mother    • Hypertension Mother    • Hypertension Father    • Cancer Father    • Immunodeficiency Sister    • Heart failure Brother    • Hypertension Brother    ,   Social History     Tobacco Use   • Smoking status: Never Smoker   • Smokeless tobacco: Never Used   Vaping Use   • Vaping Use: Never  used   Substance Use Topics   • Alcohol use: Never   • Drug use: Never   ,     Medications  Current Outpatient Medications   Medication Sig Dispense Refill   • amLODIPine (NORVASC) 5 MG tablet Take 5 mg by mouth Daily.     • aspirin 81 MG EC tablet Take 81 mg by mouth Daily.     • atorvastatin (LIPITOR) 20 MG tablet TAKE 1 TABLET EVERY NIGHT AT BEDTIME 90 tablet 3   • carvedilol (COREG) 25 MG tablet Take 25 mg by mouth 2 (Two) Times a Day With Meals.     • diclofenac (VOLTAREN) 75 MG EC tablet 2 (Two) Times a Day.     • diclofenac-miSOPROStol (ARTHROTEC 75) 75-0.2 MG EC tablet Take 1 tablet by mouth 2 (Two) Times a Day.     • diphenhydrAMINE-acetaminophen (TYLENOL PM)  MG tablet per tablet Take 2 tablets by mouth At Night As Needed for Sleep.     • furosemide (LASIX) 20 MG tablet TAKE 1 TABLET DAILY 90 tablet 3   • lisinopril-hydrochlorothiazide (PRINZIDE,ZESTORETIC) 20-25 MG per tablet Take 1 tablet by mouth Daily.     • magnesium gluconate (MAGONATE) 500 MG tablet Take 500 mg by mouth 2 (Two) Times a Day.     • Multiple Vitamin (MULTI-VITAMIN DAILY PO) Take  by mouth.     • Omega-3 Fatty Acids (FISH OIL) 1000 MG capsule capsule Take  by mouth 2 (Two) Times a Day With Meals.     • potassium chloride (K-DUR,KLOR-CON) 10 MEQ CR tablet TAKE 1 TABLET DAILY 90 tablet 3   • tamsulosin (FLOMAX) 0.4 MG capsule 24 hr capsule Take 1 capsule by mouth Every Night. 90 capsule 3   • gabapentin (NEURONTIN) 300 MG capsule Take 300 mg by mouth 3 (Three) Times a Day.       No current facility-administered medications for this visit.       Allergies:  Ciprofloxacin    Review of Systems  Review of Systems   HENT: Negative for nosebleeds.    Cardiovascular: Positive for dyspnea on exertion. Negative for chest pain, claudication, leg swelling, near-syncope, orthopnea, palpitations, paroxysmal nocturnal dyspnea and syncope.   Respiratory: Negative for hemoptysis and shortness of breath.    Musculoskeletal: Positive for arthritis  "and joint pain.   Gastrointestinal: Negative for melena.   Genitourinary: Negative for hematuria.       Objective     Physical Exam:  /78   Pulse 62   Ht 172.7 cm (68\")   Wt 119 kg (263 lb)   SpO2 95%   BMI 39.99 kg/m²   Pulmonary:      Effort: Pulmonary effort is normal.      Breath sounds: Normal breath sounds.   Cardiovascular:      Normal rate. Regular rhythm.      Murmurs: There is no murmur.   Edema:     Peripheral edema present.     Pretibial: bilateral 1+ pitting edema of the pretibial area.        Results Review:    ECG 12 Lead    Date/Time: 3/14/2022 11:06 AM  Performed by: Marcio Helton MD  Authorized by: Marcio Helton MD   Comparison: compared with previous ECG   Similar to previous ECG  Rhythm: sinus bradycardia  Rate: normal  Conduction: conduction normal  ST Segments: ST segments normal  T Waves: T waves normal  QRS axis: normal  Other findings: poor R wave progression    Clinical impression: non-specific ECG            Admission on 03/20/2019, Discharged on 03/20/2019   Component Date Value Ref Range Status   • Glucose 03/20/2019 102 (A) 70 - 100 mg/dL Final   • BUN 03/20/2019 23 (A) 5 - 21 mg/dL Final   • Creatinine 03/20/2019 0.86  0.50 - 1.40 mg/dL Final   • Sodium 03/20/2019 139  135 - 145 mmol/L Final   • Potassium 03/20/2019 4.2  3.5 - 5.3 mmol/L Final   • Chloride 03/20/2019 101  98 - 110 mmol/L Final   • CO2 03/20/2019 30.0  24.0 - 31.0 mmol/L Final   • Calcium 03/20/2019 8.9  8.4 - 10.4 mg/dL Final   • eGFR Non African Amer 03/20/2019 89  >60 mL/min/1.73 Final   • BUN/Creatinine Ratio 03/20/2019 26.7 (A) 7.0 - 25.0 Final   • Anion Gap 03/20/2019 8.0  4.0 - 13.0 mmol/L Final   • Protime 03/20/2019 14.0  11.9 - 14.6 Seconds Final   • INR 03/20/2019 1.05  0.91 - 1.09 Final   • WBC 03/20/2019 5.99  4.80 - 10.80 10*3/mm3 Final   • RBC 03/20/2019 4.55 (A) 4.80 - 5.90 10*6/mm3 Final   • Hemoglobin 03/20/2019 13.9 (A) 14.0 - 18.0 g/dL Final   • Hematocrit 03/20/2019 40.3  40.0 - 52.0 % " Final   • MCV 03/20/2019 88.6  82.0 - 95.0 fL Final   • MCH 03/20/2019 30.5  28.0 - 32.0 pg Final   • MCHC 03/20/2019 34.5  33.0 - 36.0 g/dL Final   • RDW 03/20/2019 13.3  12.0 - 15.0 % Final   • RDW-SD 03/20/2019 43.1  40.0 - 54.0 fl Final   • MPV 03/20/2019 8.8  6.0 - 12.0 fL Final   • Platelets 03/20/2019 222  130 - 400 10*3/mm3 Final   • Neutrophil % 03/20/2019 67.9  39.0 - 78.0 % Final   • Lymphocyte % 03/20/2019 17.7  15.0 - 45.0 % Final   • Monocyte % 03/20/2019 10.4  4.0 - 12.0 % Final   • Eosinophil % 03/20/2019 3.2  0.0 - 4.0 % Final   • Basophil % 03/20/2019 0.5  0.0 - 2.0 % Final   • Immature Grans % 03/20/2019 0.3  0.0 - 5.0 % Final   • Neutrophils, Absolute 03/20/2019 4.07  1.87 - 8.40 10*3/mm3 Final   • Lymphocytes, Absolute 03/20/2019 1.06  0.72 - 4.86 10*3/mm3 Final   • Monocytes, Absolute 03/20/2019 0.62  0.19 - 1.30 10*3/mm3 Final   • Eosinophils, Absolute 03/20/2019 0.19  0.00 - 0.70 10*3/mm3 Final   • Basophils, Absolute 03/20/2019 0.03  0.00 - 0.20 10*3/mm3 Final   • Immature Grans, Absolute 03/20/2019 0.02  0.00 - 0.05 10*3/mm3 Final   • nRBC 03/20/2019 0.0  0.0 - 0.0 /100 WBC Final       Assessment/Plan   Problem List Items Addressed This Visit        Cardiac and Vasculature    Coronary artery disease involving native coronary artery of native heart without angina pectoris - Primary    Current Assessment & Plan     The patient denies chest pain, shortness of breath, dyspnea on exertion, orthopnea, PND, edema. There is no evidence of ongoing ischemia             Essential hypertension    Current Assessment & Plan     Good control           Mixed hyperlipidemia    Current Assessment & Plan     Needs to be repeated              Endocrine and Metabolic    Class 3 severe obesity due to excess calories with body mass index (BMI) of 40.0 to 44.9 in adult (HCC)    Current Assessment & Plan     Has been unable to do much exercise due to recent hip surgery                 Medical Complexity  must have  2 out of 3     Moderate Complexity Level 4           1 of the following medical problems:          []One chronic illness with mild exacerbation         [x]Two or more stable chronic illness          []One new problem  []One acute illness with systemic symptoms    Complexity of Data  Reviewed (1 out of the 3 following categories)      Category 1 tests, documents, historian (must have 3 points)       []Review of prior external records  []Review of results of unique tests  [x]Ordering unique tests  []Assessment requires an independent historian    Category 2 Interpretation of tests   []Independent interpretation of test read by another doc    Category 3 Discuss Management/tests  []Discussion with external physician    Risk of complications and/or morbidity          [x]Prescription Drug Management

## 2022-05-02 ENCOUNTER — TELEPHONE (OUTPATIENT)
Dept: NEUROSURGERY | Facility: CLINIC | Age: 67
End: 2022-05-02

## 2022-05-02 NOTE — TELEPHONE ENCOUNTER
----- Message from TRISTEN Prajapati sent at 5/2/2022 12:24 PM CDT -----  I can see him Wednesday morning

## 2022-05-02 NOTE — TELEPHONE ENCOUNTER
Patient has an appointment in the office on 05/09/2022, tried to call patient to offer him a time on 05/04/2022, no answer, but have left message for a call back.  If patient calls back offer his a time on Wednesday, May 4th.

## 2022-05-04 ENCOUNTER — OFFICE VISIT (OUTPATIENT)
Dept: NEUROSURGERY | Facility: CLINIC | Age: 67
End: 2022-05-04

## 2022-05-04 ENCOUNTER — HOSPITAL ENCOUNTER (OUTPATIENT)
Dept: GENERAL RADIOLOGY | Facility: HOSPITAL | Age: 67
Discharge: HOME | End: 2022-05-04
Admitting: NURSE PRACTITIONER

## 2022-05-04 VITALS
HEIGHT: 68 IN | WEIGHT: 266 LBS | DIASTOLIC BLOOD PRESSURE: 88 MMHG | SYSTOLIC BLOOD PRESSURE: 138 MMHG | BODY MASS INDEX: 40.32 KG/M2

## 2022-05-04 DIAGNOSIS — E66.01 CLASS 3 SEVERE OBESITY DUE TO EXCESS CALORIES WITH BODY MASS INDEX (BMI) OF 40.0 TO 44.9 IN ADULT, UNSPECIFIED WHETHER SERIOUS COMORBIDITY PRESENT: ICD-10-CM

## 2022-05-04 DIAGNOSIS — M51.37 DEGENERATION OF LUMBAR OR LUMBOSACRAL INTERVERTEBRAL DISC: ICD-10-CM

## 2022-05-04 DIAGNOSIS — M51.37 DEGENERATION OF LUMBAR OR LUMBOSACRAL INTERVERTEBRAL DISC: Primary | ICD-10-CM

## 2022-05-04 DIAGNOSIS — M41.25 OTHER IDIOPATHIC SCOLIOSIS, THORACOLUMBAR REGION: ICD-10-CM

## 2022-05-04 DIAGNOSIS — Z78.9 NON-SMOKER: ICD-10-CM

## 2022-05-04 PROCEDURE — 99214 OFFICE O/P EST MOD 30 MIN: CPT | Performed by: NURSE PRACTITIONER

## 2022-05-04 PROCEDURE — 72082 X-RAY EXAM ENTIRE SPI 2/3 VW: CPT

## 2022-05-04 RX ORDER — CYCLOBENZAPRINE HCL 10 MG
10 TABLET ORAL 3 TIMES DAILY PRN
Qty: 90 TABLET | Refills: 2 | Status: SHIPPED | OUTPATIENT
Start: 2022-05-04 | End: 2022-08-30

## 2022-05-04 RX ORDER — GABAPENTIN 300 MG/1
300 CAPSULE ORAL NIGHTLY
Qty: 30 CAPSULE | Refills: 2 | Status: SHIPPED | OUTPATIENT
Start: 2022-05-04

## 2022-05-04 NOTE — PROGRESS NOTES
Chief complaint:   Chief Complaint   Patient presents with   • Back Pain     Rexal returns with complaint of low back pain, both legs bother him and he is having difficulty walking, worse on the left.  Dr. Khan did a left hip replacement in January and he thought he was doing better but for about 6 weeks now he has been dealing with the low back pain again.  Presently in physical therapy at [facility] in [place], but this is not really helping him.  He also treats with [facility] for pain management.        Subjective     HPI: This is a 66-year-old male gentleman who we have seen in the past for back pain.  The patient has had a previous back surgery in 2018 by Dr. Ole Owens for numbness and tingling in his legs bilaterally.  He did have improvement in the numbness and tingling after the surgery.  When I saw him back in October 2020 was complaining of some left groin pain and x-rays of the hip did show concern for avascular necrosis.  We did send the patient for a orthopedic evaluation and he did have a left hip replacement in January by Dr. Murali Khan.    2009 says that he was  Well up until about 6 weeks ago.  He had an increase in his back pain and leg pain.  He says the pain in his back is intermittent.  Is worse with standing and walking and better with sitting down.  He has pain that radiates into his legs bilaterally with the right being worse than the left.  The pain goes down his legs and anterior radicular fashion to his knees.  The pain is intermittent.  It is worse with standing and walking and better with sitting.  Denies any bowel or bladder incontinence.  He currently is involved with therapy at [facility] and has been working with him for about 2 weeks.  Is not had any chiropractic care.  He is working with Dr. Inman's office and is set to have an injection in his back later this month.  He is taking diclofenac for pain and an occasional tramadol.  Denies any tobacco, alcohol, or  "illicit drug use.  He is right-hand dominant.  Rates his pain on a scale 0-10 at its.  He is walking with a cane.    Review of Systems   Constitutional: Positive for activity change.   Musculoskeletal: Positive for arthralgias, back pain, gait problem and myalgias.   Neurological: Positive for numbness. Negative for seizures and weakness.   Psychiatric/Behavioral: Negative.    All other systems reviewed and are negative.       Past Medical History:   Diagnosis Date   • CAD (coronary artery disease)    • Cancer (HCC)     skin   • Hyperchloremia    • Hyperlipidemia    • Hypertension    • Left leg pain    • Low back pain    • Myocardial infarction (HCC)    • Peripheral vascular disease (HCC)    • Retention, urine    • S/P coronary artery stent placement     right side   • Sleep apnea      Past Surgical History:   Procedure Laterality Date   • ANKLE ARTHROPLASTY     • BACK SURGERY  03/2018    L4-L5  Dr. Owens   • CARDIAC CATHETERIZATION     • CARDIAC CATHETERIZATION N/A 03/20/2019    Procedure: Left Heart Cath;  Surgeon: Marcio Helton MD;  Location:  [facility];  Service: Cardiology   • CORONARY STENT PLACEMENT     • FOOT SURGERY Left    • HERNIA REPAIR     • REPLACEMENT TOTAL KNEE     • SHOULDER SURGERY     • TOTAL HIP ARTHROPLASTY Left      Family History   Problem Relation Age of Onset   • Cancer Mother    • Hypertension Mother    • Hypertension Father    • Cancer Father    • Immunodeficiency Sister    • Heart failure Brother    • Hypertension Brother      Social History     Tobacco Use   • Smoking status: Never Smoker   • Smokeless tobacco: Never Used   Vaping Use   • Vaping Use: Never used   Substance Use Topics   • Alcohol use: Never   • Drug use: Never     (Not in a hospital admission)    Allergies:  Ciprofloxacin    Objective      Vital Signs  /88   Ht 172.7 cm (68\")   Wt 121 kg (266 lb)   BMI 40.45 kg/m²     Physical Exam  Constitutional:       Appearance: Normal appearance. He is " well-developed.   HENT:      Head: Normocephalic.   Eyes:      General: Lids are normal.      Extraocular Movements: EOM normal.      Conjunctiva/sclera: Conjunctivae normal.      Pupils: Pupils are equal, round, and reactive to light.   Cardiovascular:      Rate and Rhythm: Normal rate and regular rhythm.   Pulmonary:      Effort: Pulmonary effort is normal.      Breath sounds: Normal breath sounds.   Musculoskeletal:      Cervical back: Normal range of motion.      Right ankle: Decreased range of motion.      Comments: Back and leg pain   Skin:     General: Skin is warm.   Neurological:      Mental Status: He is alert and oriented to person, place, and time.      GCS: GCS eye subscore is 4. GCS verbal subscore is 5. GCS motor subscore is 6.      Cranial Nerves: No cranial nerve deficit.      Sensory: No sensory deficit.      Gait: Gait abnormal.      Deep Tendon Reflexes: Strength normal and reflexes are normal and symmetric. Reflexes normal.   Psychiatric:         Speech: Speech normal.         Behavior: Behavior normal.         Thought Content: Thought content normal.         Neurologic Exam     Mental Status   Oriented to person, place, and time.   Attention: normal. Concentration: normal.   Speech: speech is normal   Level of consciousness: alert  Normal comprehension.     Cranial Nerves     CN II   Visual fields full to confrontation.     CN III, IV, VI   Pupils are equal, round, and reactive to light.  Extraocular motions are normal.     CN V   Facial sensation intact.     CN VII   Facial expression full, symmetric.     CN VIII   CN VIII normal.     CN IX, X   CN IX normal.   CN X normal.     CN XI   CN XI normal.     CN XII   CN XII normal.     Motor Exam   Muscle bulk: normal    Strength   Strength 5/5 throughout.     Sensory Exam   Light touch normal.     Gait, Coordination, and Reflexes     Reflexes   Reflexes 2+ except as noted.   Right ankle clonus: absent  Left ankle clonus: absentIndependent gait but  walking with a cane       Imaging review: MRI of the lumbar spine that was done in October 2021 shows a previous fusion at L4-5.  There is disc degeneration with lumbar stenosis and bilateral foraminal narrowing noted at L5-S1.  There is still a significant amount of stenosis noted at L4-5 with bilateral foraminal narrowing.  At L3-4 lumbar stenosis with bilateral foraminal narrowing and disc degeneration.  At L2-3 disc degeneration with lumbar stenosis and bilateral foraminal narrowing.  At L1-2 lumbar stenosis with bilateral foraminal narrowing and disc degeneration.  At T12-L1 there is disc degeneration with autofusion and a scoliotic deformity with bilateral foraminal narrowing with the left being worse than the right.  No fracture visualized.  No cord signal change the conus terminates at L1.        Assessment/Plan: The patient is currently involved in conservative measures with physical therapy and pain management.  I would like to see him continue with these treatments at this time.  I will also send the patient for set of scoliosis x-rays.  Also started on gabapentin 300 mg at nighttime as well as Flexeril.  He can continue with diclofenac.  We will have him follow-up with Dr. Troncoso at the next available appointment.  He was told to call us if any worsening problems or concerns.  Patient is a nonsmoker  The patient's Body mass index is 40.45 kg/m².. BMI is above normal parameters. Recommendations include: educational material and nutrition counseling  Advance Care Planning   ACP discussion was held with the patient during this visit. Patient does not have an advance directive, information provided.      Diagnoses and all orders for this visit:    1. Degeneration of lumbar or lumbosacral intervertebral disc (Primary)  -     XR Scoliosis Complete Including Supine & Erect; Future  -     gabapentin (Neurontin) 300 MG capsule; Take 1 capsule by mouth Every Night.  Dispense: 30 capsule; Refill: 2    2. Other  idiopathic scoliosis, thoracolumbar region  -     XR Scoliosis Complete Including Supine & Erect; Future  -     gabapentin (Neurontin) 300 MG capsule; Take 1 capsule by mouth Every Night.  Dispense: 30 capsule; Refill: 2    3. Non-smoker    4. Class 3 severe obesity due to excess calories with body mass index (BMI) of 40.0 to 44.9 in adult, unspecified whether serious comorbidity present (HCC)    Other orders  -     cyclobenzaprine (FLEXERIL) 10 MG tablet; Take 1 tablet by mouth 3 (Three) Times a Day As Needed for Muscle Spasms.  Dispense: 90 tablet; Refill: 2          I discussed the patients findings and my recommendations with patient    Ismael Ruiz, APRN  05/04/22  10:31 CDT

## 2022-05-04 NOTE — PATIENT INSTRUCTIONS
"https://www.nhlbi.nih.gov/files/docs/public/heart/dash_brief.pdf\">   DASH Eating Plan  DASH stands for Dietary Approaches to Stop Hypertension. The DASH eating plan is a healthy eating plan that has been shown to:  Reduce high blood pressure (hypertension).  Reduce your risk for type 2 diabetes, heart disease, and stroke.  Help with weight loss.  What are tips for following this plan?  Reading food labels  Check food labels for the amount of salt (sodium) per serving. Choose foods with less than 5 percent of the Daily Value of sodium. Generally, foods with less than 300 milligrams (mg) of sodium per serving fit into this eating plan.  To find whole grains, look for the word \"whole\" as the first word in the ingredient list.  Shopping  Buy products labeled as \"low-sodium\" or \"no salt added.\"  Buy fresh foods. Avoid canned foods and pre-made or frozen meals.  Cooking  Avoid adding salt when cooking. Use salt-free seasonings or herbs instead of table salt or sea salt. Check with your health care provider or pharmacist before using salt substitutes.  Do not whalen foods. Cook foods using healthy methods such as baking, boiling, grilling, roasting, and broiling instead.  Cook with heart-healthy oils, such as olive, canola, avocado, soybean, or sunflower oil.  Meal planning    Eat a balanced diet that includes:  4 or more servings of fruits and 4 or more servings of vegetables each day. Try to fill one-half of your plate with fruits and vegetables.  6-8 servings of whole grains each day.  Less than 6 oz (170 g) of lean meat, poultry, or fish each day. A 3-oz (85-g) serving of meat is about the same size as a deck of cards. One egg equals 1 oz (28 g).  2-3 servings of low-fat dairy each day. One serving is 1 cup (237 mL).  1 serving of nuts, seeds, or beans 5 times each week.  2-3 servings of heart-healthy fats. Healthy fats called omega-3 fatty acids are found in foods such as walnuts, flaxseeds, fortified milks, and eggs. " These fats are also found in cold-water fish, such as sardines, salmon, and mackerel.  Limit how much you eat of:  Canned or prepackaged foods.  Food that is high in trans fat, such as some fried foods.  Food that is high in saturated fat, such as fatty meat.  Desserts and other sweets, sugary drinks, and other foods with added sugar.  Full-fat dairy products.  Do not salt foods before eating.  Do not eat more than 4 egg yolks a week.  Try to eat at least 2 vegetarian meals a week.  Eat more home-cooked food and less restaurant, buffet, and fast food.    Lifestyle  When eating at a restaurant, ask that your food be prepared with less salt or no salt, if possible.  If you drink alcohol:  Limit how much you use to:  0-1 drink a day for women who are not pregnant.  0-2 drinks a day for men.  Be aware of how much alcohol is in your drink. In the [place] one drink equals one 12 oz bottle of beer (355 mL), one 5 oz glass of wine (148 mL), or one 1½ oz glass of hard liquor (44 mL).  General information  Avoid eating more than 2,300 mg of salt a day. If you have hypertension, you may need to reduce your sodium intake to 1,500 mg a day.  Work with your health care provider to maintain a healthy body weight or to lose weight. Ask what an ideal weight is for you.  Get at least 30 minutes of exercise that causes your heart to beat faster (aerobic exercise) most days of the week. Activities may include walking, swimming, or biking.  Work with your health care provider or dietitian to adjust your eating plan to your individual calorie needs.  What foods should I eat?  Fruits  All fresh, dried, or frozen fruit. Canned fruit in natural juice (without added sugar).  Vegetables  Fresh or frozen vegetables (raw, steamed, roasted, or grilled). Low-sodium or reduced-sodium tomato and vegetable juice. Low-sodium or reduced-sodium tomato sauce and tomato paste. Low-sodium or reduced-sodium canned vegetables.  Grains  Whole-grain or  whole-wheat bread. Whole-grain or whole-wheat pasta. Brown rice. Oatmeal. Quinoa. Bulgur. Whole-grain and low-sodium cereals. Teressa bread. Low-fat, low-sodium crackers. Whole-wheat flour tortillas.  Meats and other proteins  Skinless chicken or turkey. Ground chicken or turkey. Pork with fat trimmed off. Fish and seafood. Egg whites. Dried beans, peas, or lentils. Unsalted nuts, nut butters, and seeds. Unsalted canned beans. Lean cuts of beef with fat trimmed off. Low-sodium, lean precooked or cured meat, such as sausages or meat loaves.  Dairy  Low-fat (1%) or fat-free (skim) milk. Reduced-fat, low-fat, or fat-free cheeses. Nonfat, low-sodium ricotta or cottage cheese. Low-fat or nonfat yogurt. Low-fat, low-sodium cheese.  Fats and oils  Soft margarine without trans fats. Vegetable oil. Reduced-fat, low-fat, or light mayonnaise and salad dressings (reduced-sodium). Canola, safflower, olive, avocado, soybean, and sunflower oils. Avocado.  Seasonings and condiments  Herbs. Spices. Seasoning mixes without salt.  Other foods  Unsalted popcorn and pretzels. Fat-free sweets.  The items listed above may not be a complete list of foods and beverages you can eat. Contact a dietitian for more information.  What foods should I avoid?  Fruits  Canned fruit in a light or heavy syrup. Fried fruit. Fruit in cream or butter sauce.  Vegetables  Creamed or fried vegetables. Vegetables in a cheese sauce. Regular canned vegetables (not low-sodium or reduced-sodium). Regular canned tomato sauce and paste (not low-sodium or reduced-sodium). Regular tomato and vegetable juice (not low-sodium or reduced-sodium). Pickles. Olives.  Grains  Baked goods made with fat, such as croissants, muffins, or some breads. Dry pasta or rice meal packs.  Meats and other proteins  Fatty cuts of meat. Ribs. Fried meat. Leavitt. Bologna, salami, and other precooked or cured meats, such as sausages or meat loaves. Fat from the back of a pig (fatback).  Bratwurst. Salted nuts and seeds. Canned beans with added salt. Canned or smoked fish. Whole eggs or egg yolks. Chicken or turkey with skin.  Dairy  Whole or 2% milk, cream, and half-and-half. Whole or full-fat cream cheese. Whole-fat or sweetened yogurt. Full-fat cheese. Nondairy creamers. Whipped toppings. Processed cheese and cheese spreads.  Fats and oils  Butter. Stick margarine. Lard. Shortening. Ghee. Leavitt fat. Tropical oils, such as coconut, palm kernel, or palm oil.  Seasonings and condiments  Onion salt, garlic salt, seasoned salt, table salt, and sea salt. Worcestershire sauce. Tartar sauce. Barbecue sauce. Teriyaki sauce. Soy sauce, including reduced-sodium. Steak sauce. Canned and packaged gravies. Fish sauce. Oyster sauce. Cocktail sauce. Store-bought horseradish. Ketchup. Mustard. Meat flavorings and tenderizers. Bouillon cubes. Hot sauces. Pre-made or packaged marinades. Pre-made or packaged taco seasonings. Relishes. Regular salad dressings.  Other foods  Salted popcorn and pretzels.  The items listed above may not be a complete list of foods and beverages you should avoid. Contact a dietitian for more information.  Where to find more information  National Heart, Lung, and Blood [place]: www.nhlbi.nih.gov  American Heart Association: www.heart.org  Academy of Nutrition and Dietetics: www.eatright.org  National Kidney Foundation: www.kidney.org  Summary  The DASH eating plan is a healthy eating plan that has been shown to reduce high blood pressure (hypertension). It may also reduce your risk for type 2 diabetes, heart disease, and stroke.  When on the DASH eating plan, aim to eat more fresh fruits and vegetables, whole grains, lean proteins, low-fat dairy, and heart-healthy fats.  With the DASH eating plan, you should limit salt (sodium) intake to 2,300 mg a day. If you have hypertension, you may need to reduce your sodium intake to 1,500 mg a day.  Work with your health care provider or  dietitian to adjust your eating plan to your individual calorie needs.  This information is not intended to replace advice given to you by your health care provider. Make sure you discuss any questions you have with your health care provider.  Document Revised: 11/20/2020 Document Reviewed: 11/20/2020  [employer] Patient Education © 2021 [employer]  BMI for Adults  What is BMI?  Body mass index (BMI) is a number that is calculated from a person's weight and height. BMI can help estimate how much of a person's weight is composed of fat. BMI does not measure body fat directly. Rather, it is an alternative to procedures that directly measure body fat, which can be difficult and expensive.  BMI can help identify people who may be at higher risk for certain medical problems.  What are BMI measurements used for?  BMI is used as a screening tool to identify possible weight problems. It helps determine whether a person is obese, overweight, a healthy weight, or underweight.  BMI is useful for:  Identifying a weight problem that may be related to a medical condition or may increase the risk for medical problems.  Promoting changes, such as changes in diet and exercise, to help reach a healthy weight. BMI screening can be repeated to see if these changes are working.  How is BMI calculated?  BMI involves measuring your weight in relation to your height. Both height and weight are measured, and the BMI is calculated from those numbers. This can be done either in English ([place] or metric measurements. Note that charts and online BMI calculators are available to help you find your BMI quickly and easily without having to do these calculations yourself.  To calculate your BMI in English ([place] measurements:    Measure your weight in pounds (lb).  Multiply the number of pounds by 703.  For example, for a person who weighs 180 lb, multiply that number by 703, which equals 126,540.  Measure your height in inches. Then multiply that  "number by itself to get a measurement called \"inches squared.\"  For example, for a person who is 70 inches tall, the \"inches squared\" measurement is 70 inches x 70 inches, which equals 4,900 inches squared.  Divide the total from step 2 (number of lb x 703) by the total from step 3 (inches squared): 126,540 ÷ 4,900 = 25.8. This is your BMI.    To calculate your BMI in metric measurements:  Measure your weight in kilograms (kg).  Measure your height in meters (m). Then multiply that number by itself to get a measurement called \"meters squared.\"  For example, for a person who is 1.75 m tall, the \"meters squared\" measurement is 1.75 m x 1.75 m, which is equal to 3.1 meters squared.  Divide the number of kilograms (your weight) by the meters squared number. In this example: 70 ÷ 3.1 = 22.6. This is your BMI.  What do the results mean?  BMI charts are used to identify whether you are underweight, normal weight, overweight, or obese. The following guidelines will be used:  Underweight: BMI less than 18.5.  Normal weight: BMI between 18.5 and 24.9.  Overweight: BMI between 25 and 29.9.  Obese: BMI of 30 or above.  Keep these notes in mind:  Weight includes both fat and muscle, so someone with a muscular build, such as an athlete, may have a BMI that is higher than 24.9. In cases like these, BMI is not an accurate measure of body fat.  To determine if excess body fat is the cause of a BMI of 25 or higher, further assessments may need to be done by a health care provider.  BMI is usually interpreted in the same way for men and women.  Where to find more information  For more information about BMI, including tools to quickly calculate your BMI, go to these websites:  Centers for Disease Control and Prevention: www.cdc.gov  American Heart Association: www.heart.org  National Heart, Lung, and Blood [place]: www.nhlbi.nih.gov  Summary  Body mass index (BMI) is a number that is calculated from a person's weight and height.  BMI " may help estimate how much of a person's weight is composed of fat. BMI can help identify those who may be at higher risk for certain medical problems.  BMI can be measured using English measurements or metric measurements.  BMI charts are used to identify whether you are underweight, normal weight, overweight, or obese.  This information is not intended to replace advice given to you by your health care provider. Make sure you discuss any questions you have with your health care provider.  Document Revised: 09/09/2020 Document Reviewed: 07/17/2020  [employer] Patient Education © 2021 [employer]    Advance Care Planning and Advance Directives     You make decisions on a daily basis - decisions about where you want to live, your career, your home, your life. Perhaps one of the most important decisions you face is your choice for future medical care. Take time to talk with your family and your healthcare team and start planning today.  Advance Care Planning is a process that can help you:  Understand possible future healthcare decisions in light of your own experiences  Reflect on those decision in light of your goals and values  Discuss your decisions with those closest to you and the healthcare professionals that care for you  Make a plan by creating a document that reflects your wishes    Surrogate Decision Maker  In the event of a medical emergency, which has left you unable to communicate or to make your own decisions, you would need someone to make decisions for you.  It is important to discuss your preferences for medical treatment with this person while you are in good health.     Qualities of a surrogate decision maker:  Willing to take on this role and responsibility  Knows what you want for future medical care  Willing to follow your wishes even if they don't agree with them  Able to make difficult medical decisions under stressful circumstances    Advance Directives  These are legal documents you can  create that will guide your healthcare team and decision maker(s) when needed. These documents can be stored in the electronic medical record.    Living Will - a legal document to guide your care if you have a terminal condition or a serious illness and are unable to communicate. States vary by statute in document names/types, but most forms may include one or more of the following:        -  Directions regarding life-prolonging treatments        -  Directions regarding artificially provided nutrition/hydration        -  Choosing a healthcare decision maker        -  Direction regarding organ/tissue donation    Durable Power of  for Healthcare - this document names an -in-fact to make medical decisions for you, but it may also allow this person to make personal and financial decisions for you. Please seek the advice of an  if you need this type of document.    **Advance Directives are not required and no one may discriminate against you if you do not sign one.    Medical Orders  Many states allow specific forms/orders signed by your physician to record your wishes for medical treatment in your current state of health. This form, signed in personal communication with your physician, addresses resuscitation and other medical interventions that you may or may not want.      For more information or to schedule a time with a [facility] Advance Care Planning Facilitator contact: [email or web address] or call [phone] and someone will contact you directly.

## 2022-08-30 ENCOUNTER — OFFICE VISIT (OUTPATIENT)
Dept: NEUROSURGERY | Facility: CLINIC | Age: 67
End: 2022-08-30

## 2022-08-30 VITALS — WEIGHT: 272 LBS | HEIGHT: 68 IN | BODY MASS INDEX: 41.22 KG/M2

## 2022-08-30 DIAGNOSIS — E66.01 CLASS 3 SEVERE OBESITY DUE TO EXCESS CALORIES WITH BODY MASS INDEX (BMI) OF 40.0 TO 44.9 IN ADULT, UNSPECIFIED WHETHER SERIOUS COMORBIDITY PRESENT: ICD-10-CM

## 2022-08-30 DIAGNOSIS — Z78.9 NON-SMOKER: ICD-10-CM

## 2022-08-30 DIAGNOSIS — M51.37 DEGENERATION OF LUMBAR OR LUMBOSACRAL INTERVERTEBRAL DISC: Primary | ICD-10-CM

## 2022-08-30 DIAGNOSIS — M25.552 LEFT HIP PAIN: ICD-10-CM

## 2022-08-30 DIAGNOSIS — M41.25 OTHER IDIOPATHIC SCOLIOSIS, THORACOLUMBAR REGION: ICD-10-CM

## 2022-08-30 PROCEDURE — 99213 OFFICE O/P EST LOW 20 MIN: CPT | Performed by: NEUROLOGICAL SURGERY

## 2022-08-30 NOTE — PROGRESS NOTES
SUBJECTIVE:  Patient ID: Caron Newman is a 66 y.o. male is here today for follow-up.    Chief Complaint: Back pain  Chief Complaint   Patient presents with   • BACK & LEG PAIN     Patient is here for follow up after completing therapy w/o relief of his symptoms.  He does see Dr Inman for pain mgmt.  Outside imaging on PACS.  Patient had surgery by Dr Owens in 2018.  TODAY: patient states he has some back pain but his main complaint is LLE weakness/pain, he states he feels like he can't lift it.       HPI  66-year-old gentleman with a history of back pain, gait difficulty and multiple orthopedic issues.  He does have some degree of back pain and leg pain especially on the left.  If he stands or walks for too long he like to sit down to get relief.  He is pretty clear though that his gait and walking issues are more related to weakness and stability rather than pain.  He is done dozens of visits with physical therapy for back pain, walking issues, recovered from his total hip replacement in January of this year.  He also works with Dr. Inman.  He has had 1 epidural injection.  He takes gabapentin currently.  He is followed up with Dr. Khan after the left total hip who feels that the implant is in good position without any complication.    The following portions of the patient's history were reviewed and updated as appropriate: allergies, current medications, past family history, past medical history, past social history, past surgical history and problem list.    OBJECTIVE:    Review of Systems   Musculoskeletal: Positive for arthralgias, back pain and gait problem.   All other systems reviewed and are negative.         Physical Exam  Eyes:      Extraocular Movements: EOM normal.      Pupils: Pupils are equal, round, and reactive to light.   Neurological:      Mental Status: He is oriented to person, place, and time.      Coordination: Finger-Nose-Finger Test normal.      Deep Tendon Reflexes: Strength normal.       Reflex Scores:       Patellar reflexes are 0 on the right side and 0 on the left side.  Psychiatric:         Speech: Speech normal.         Neurologic Exam     Mental Status   Oriented to person, place, and time.   Attention: normal.   Speech: speech is normal   Level of consciousness: alert  Knowledge: good.     Cranial Nerves     CN II   Visual fields full to confrontation.     CN III, IV, VI   Pupils are equal, round, and reactive to light.  Extraocular motions are normal.     CN V   Facial sensation intact.     CN VII   Facial expression full, symmetric.     CN VIII   CN VIII normal.     CN IX, X   CN IX normal.   CN X normal.     CN XI   CN XI normal.     CN XII   CN XII normal.     Motor Exam   Muscle bulk: normal  Overall muscle tone: normal  Right arm pronator drift: absent  Left arm pronator drift: absent    Strength   Strength 5/5 throughout. His right ankle is fused and does not dorsiflex or plantarflex    Left lower extremity iliopsoas muscle 3+ out of 5, quadriceps 5-5, hamstrings 5 out of 5, plantar flexion dorsiflexion 4-5     Sensory Exam   Light touch normal.   Pinprick normal.     Gait, Coordination, and Reflexes     Gait  Gait: (He has a rolling limping gait that is not unstable.)    Coordination   Finger to nose coordination: normal    Tremor   Resting tremor: absent  Intention tremor: absent  Action tremor: absent    Reflexes   Reflexes 2+ except as noted.   Right patellar: 0  Left patellar: 0      Independent Review of Radiographic Studies:   Plain x-rays and MRI shows a significant degenerative scoliotic deformity that is caused multilevel stenosis and foraminal narrowing throughout the thoracolumbar spine.  There is a compression wedge deformity centered around T12-L1 resulting in a significant straightening of the lordotic curve and a sagittal imbalance.    ASSESSMENT/PLAN:  The patient has a multilevel significant thoracolumbar degenerative disc disease as well as a scoliosis deformity.  I  do not think surgical correction of this issue would be a reasonable thing to consider in this patient.  His gait difficulty could be due to some degree of musculoskeletal weakness after his left total hip but it also could be due to his significant back issues and lumbar stenosis.  We discussed these findings at length.  I do not think at this point he would be a reasonable candidate for an extensive scoliosis deformity correction and decompression.  I recommend that he continue to work with Dr. Inman and continue to work on lifestyle changes in order to optimize his function.      1. Degeneration of lumbar or lumbosacral intervertebral disc    2. Other idiopathic scoliosis, thoracolumbar region    3. Left hip pain    4. Non-smoker    5. Class 3 severe obesity due to excess calories with body mass index (BMI) of 40.0 to 44.9 in adult, unspecified whether serious comorbidity present (HCC)        The patient's Body mass index is 41.36 kg/m².. BMI is above normal parameters. Recommendations include: educational material    Return if symptoms worsen or fail to improve.      Merlin Troncoso MD

## 2022-08-30 NOTE — PATIENT INSTRUCTIONS
"PATIENT TO CONTINUE TO FOLLOW UP WITH HIS PRIMARY CARE PROVIDER FOR YEARLY PHYSICAL EXAMS TO ENSURE COMPLETE HEALTH MAINTENANCE      BMI for Adults  What is BMI?  Body mass index (BMI) is a number that is calculated from a person's weight and height. BMI can help estimate how much of a person's weight is composed of fat. BMI does not measure body fat directly. Rather, it is an alternative to procedures that directly measure body fat, which can be difficult and expensive.  BMI can help identify people who may be at higher risk for certain medical problems.  What are BMI measurements used for?  BMI is used as a screening tool to identify possible weight problems. It helps determine whether a person is obese, overweight, a healthy weight, or underweight.  BMI is useful for:  Identifying a weight problem that may be related to a medical condition or may increase the risk for medical problems.  Promoting changes, such as changes in diet and exercise, to help reach a healthy weight. BMI screening can be repeated to see if these changes are working.  How is BMI calculated?  BMI involves measuring your weight in relation to your height. Both height and weight are measured, and the BMI is calculated from those numbers. This can be done either in English ([place] or metric measurements. Note that charts and online BMI calculators are available to help you find your BMI quickly and easily without having to do these calculations yourself.  To calculate your BMI in English ([place] measurements:    Measure your weight in pounds (lb).  Multiply the number of pounds by 703.  For example, for a person who weighs 180 lb, multiply that number by 703, which equals 126,540.  Measure your height in inches. Then multiply that number by itself to get a measurement called \"inches squared.\"  For example, for a person who is 70 inches tall, the \"inches squared\" measurement is 70 inches x 70 inches, which equals 4,900 inches squared.  Divide the " "total from step 2 (number of lb x 703) by the total from step 3 (inches squared): 126,540 ÷ 4,900 = 25.8. This is your BMI.    To calculate your BMI in metric measurements:  Measure your weight in kilograms (kg).  Measure your height in meters (m). Then multiply that number by itself to get a measurement called \"meters squared.\"  For example, for a person who is 1.75 m tall, the \"meters squared\" measurement is 1.75 m x 1.75 m, which is equal to 3.1 meters squared.  Divide the number of kilograms (your weight) by the meters squared number. In this example: 70 ÷ 3.1 = 22.6. This is your BMI.  What do the results mean?  BMI charts are used to identify whether you are underweight, normal weight, overweight, or obese. The following guidelines will be used:  Underweight: BMI less than 18.5.  Normal weight: BMI between 18.5 and 24.9.  Overweight: BMI between 25 and 29.9.  Obese: BMI of 30 or above.  Keep these notes in mind:  Weight includes both fat and muscle, so someone with a muscular build, such as an athlete, may have a BMI that is higher than 24.9. In cases like these, BMI is not an accurate measure of body fat.  To determine if excess body fat is the cause of a BMI of 25 or higher, further assessments may need to be done by a health care provider.  BMI is usually interpreted in the same way for men and women.  Where to find more information  For more information about BMI, including tools to quickly calculate your BMI, go to these websites:  Centers for Disease Control and Prevention: www.cdc.gov  American Heart Association: www.heart.org  National Heart, Lung, and Blood [place]: www.nhlbi.nih.gov  Summary  Body mass index (BMI) is a number that is calculated from a person's weight and height.  BMI may help estimate how much of a person's weight is composed of fat. BMI can help identify those who may be at higher risk for certain medical problems.  BMI can be measured using English measurements or metric " "measurements.  BMI charts are used to identify whether you are underweight, normal weight, overweight, or obese.  This information is not intended to replace advice given to you by your health care provider. Make sure you discuss any questions you have with your health care provider.  Document Revised: 09/09/2020 Document Reviewed: 07/17/2020  [employer] Patient Education © 2021 [employer]  https://www.nhlbi.nih.gov/files/docs/public/heart/dash_brief.pdf\">   DASH Eating Plan  DASH stands for Dietary Approaches to Stop Hypertension. The DASH eating plan is a healthy eating plan that has been shown to:  Reduce high blood pressure (hypertension).  Reduce your risk for type 2 diabetes, heart disease, and stroke.  Help with weight loss.  What are tips for following this plan?  Reading food labels  Check food labels for the amount of salt (sodium) per serving. Choose foods with less than 5 percent of the Daily Value of sodium. Generally, foods with less than 300 milligrams (mg) of sodium per serving fit into this eating plan.  To find whole grains, look for the word \"whole\" as the first word in the ingredient list.  Shopping  Buy products labeled as \"low-sodium\" or \"no salt added.\"  Buy fresh foods. Avoid canned foods and pre-made or frozen meals.  Cooking  Avoid adding salt when cooking. Use salt-free seasonings or herbs instead of table salt or sea salt. Check with your health care provider or pharmacist before using salt substitutes.  Do not whalen foods. Cook foods using healthy methods such as baking, boiling, grilling, roasting, and broiling instead.  Cook with heart-healthy oils, such as olive, canola, avocado, soybean, or sunflower oil.  Meal planning    Eat a balanced diet that includes:  4 or more servings of fruits and 4 or more servings of vegetables each day. Try to fill one-half of your plate with fruits and vegetables.  6-8 servings of whole grains each day.  Less than 6 oz (170 g) of lean meat, poultry, or " fish each day. A 3-oz (85-g) serving of meat is about the same size as a deck of cards. One egg equals 1 oz (28 g).  2-3 servings of low-fat dairy each day. One serving is 1 cup (237 mL).  1 serving of nuts, seeds, or beans 5 times each week.  2-3 servings of heart-healthy fats. Healthy fats called omega-3 fatty acids are found in foods such as walnuts, flaxseeds, fortified milks, and eggs. These fats are also found in cold-water fish, such as sardines, salmon, and mackerel.  Limit how much you eat of:  Canned or prepackaged foods.  Food that is high in trans fat, such as some fried foods.  Food that is high in saturated fat, such as fatty meat.  Desserts and other sweets, sugary drinks, and other foods with added sugar.  Full-fat dairy products.  Do not salt foods before eating.  Do not eat more than 4 egg yolks a week.  Try to eat at least 2 vegetarian meals a week.  Eat more home-cooked food and less restaurant, buffet, and fast food.    Lifestyle  When eating at a restaurant, ask that your food be prepared with less salt or no salt, if possible.  If you drink alcohol:  Limit how much you use to:  0-1 drink a day for women who are not pregnant.  0-2 drinks a day for men.  Be aware of how much alcohol is in your drink. In the [place] one drink equals one 12 oz bottle of beer (355 mL), one 5 oz glass of wine (148 mL), or one 1½ oz glass of hard liquor (44 mL).  General information  Avoid eating more than 2,300 mg of salt a day. If you have hypertension, you may need to reduce your sodium intake to 1,500 mg a day.  Work with your health care provider to maintain a healthy body weight or to lose weight. Ask what an ideal weight is for you.  Get at least 30 minutes of exercise that causes your heart to beat faster (aerobic exercise) most days of the week. Activities may include walking, swimming, or biking.  Work with your health care provider or dietitian to adjust your eating plan to your individual calorie  needs.  What foods should I eat?  Fruits  All fresh, dried, or frozen fruit. Canned fruit in natural juice (without added sugar).  Vegetables  Fresh or frozen vegetables (raw, steamed, roasted, or grilled). Low-sodium or reduced-sodium tomato and vegetable juice. Low-sodium or reduced-sodium tomato sauce and tomato paste. Low-sodium or reduced-sodium canned vegetables.  Grains  Whole-grain or whole-wheat bread. Whole-grain or whole-wheat pasta. Brown rice. Oatmeal. Quinoa. Bulgur. Whole-grain and low-sodium cereals. Teressa bread. Low-fat, low-sodium crackers. Whole-wheat flour tortillas.  Meats and other proteins  Skinless chicken or turkey. Ground chicken or turkey. Pork with fat trimmed off. Fish and seafood. Egg whites. Dried beans, peas, or lentils. Unsalted nuts, nut butters, and seeds. Unsalted canned beans. Lean cuts of beef with fat trimmed off. Low-sodium, lean precooked or cured meat, such as sausages or meat loaves.  Dairy  Low-fat (1%) or fat-free (skim) milk. Reduced-fat, low-fat, or fat-free cheeses. Nonfat, low-sodium ricotta or cottage cheese. Low-fat or nonfat yogurt. Low-fat, low-sodium cheese.  Fats and oils  Soft margarine without trans fats. Vegetable oil. Reduced-fat, low-fat, or light mayonnaise and salad dressings (reduced-sodium). Canola, safflower, olive, avocado, soybean, and sunflower oils. Avocado.  Seasonings and condiments  Herbs. Spices. Seasoning mixes without salt.  Other foods  Unsalted popcorn and pretzels. Fat-free sweets.  The items listed above may not be a complete list of foods and beverages you can eat. Contact a dietitian for more information.  What foods should I avoid?  Fruits  Canned fruit in a light or heavy syrup. Fried fruit. Fruit in cream or butter sauce.  Vegetables  Creamed or fried vegetables. Vegetables in a cheese sauce. Regular canned vegetables (not low-sodium or reduced-sodium). Regular canned tomato sauce and paste (not low-sodium or reduced-sodium). Regular  tomato and vegetable juice (not low-sodium or reduced-sodium). Pickles. Olives.  Grains  Baked goods made with fat, such as croissants, muffins, or some breads. Dry pasta or rice meal packs.  Meats and other proteins  Fatty cuts of meat. Ribs. Fried meat. Leavitt. Bologna, salami, and other precooked or cured meats, such as sausages or meat loaves. Fat from the back of a pig (fatback). Bratwurst. Salted nuts and seeds. Canned beans with added salt. Canned or smoked fish. Whole eggs or egg yolks. Chicken or turkey with skin.  Dairy  Whole or 2% milk, cream, and half-and-half. Whole or full-fat cream cheese. Whole-fat or sweetened yogurt. Full-fat cheese. Nondairy creamers. Whipped toppings. Processed cheese and cheese spreads.  Fats and oils  Butter. Stick margarine. Lard. Shortening. Ghee. [place]avitt fat. Tropical oils, such as coconut, palm kernel, or palm oil.  Seasonings and condiments  Onion salt, garlic salt, seasoned salt, table salt, and sea salt. Worcestershire sauce. Tartar sauce. Barbecue sauce. Teriyaki sauce. Soy sauce, including reduced-sodium. Steak sauce. Canned and packaged gravies. Fish sauce. Oyster sauce. Cocktail sauce. Store-bought horseradish. Ketchup. Mustard. Meat flavorings and tenderizers. Bouillon cubes. Hot sauces. Pre-made or packaged marinades. Pre-made or packaged taco seasonings. Relishes. Regular salad dressings.  Other foods  Salted popcorn and pretzels.  The items listed above may not be a complete list of foods and beverages you should avoid. Contact a dietitian for more information.  Where to find more information  National Heart, Lung, and Blood [place]: www.nhlbi.nih.gov  American Heart Association: www.heart.org  Academy of Nutrition and Dietetics: www.eatright.org  National Kidney Foundation: www.kidney.org  Summary  The DASH eating plan is a healthy eating plan that has been shown to reduce high blood pressure (hypertension). It may also reduce your risk for type 2 diabetes,  heart disease, and stroke.  When on the DASH eating plan, aim to eat more fresh fruits and vegetables, whole grains, lean proteins, low-fat dairy, and heart-healthy fats.  With the DASH eating plan, you should limit salt (sodium) intake to 2,300 mg a day. If you have hypertension, you may need to reduce your sodium intake to 1,500 mg a day.  Work with your health care provider or dietitian to adjust your eating plan to your individual calorie needs.  This information is not intended to replace advice given to you by your health care provider. Make sure you discuss any questions you have with your health care provider.  Document Revised: 11/20/2020 Document Reviewed: 11/20/2020  [employer] Patient Education © 2021 [employer]

## 2023-03-17 ENCOUNTER — OFFICE VISIT (OUTPATIENT)
Dept: CARDIOLOGY | Facility: CLINIC | Age: 68
End: 2023-03-17
Payer: MEDICARE

## 2023-03-17 VITALS
HEART RATE: 61 BPM | BODY MASS INDEX: 39.56 KG/M2 | DIASTOLIC BLOOD PRESSURE: 70 MMHG | HEIGHT: 68 IN | WEIGHT: 261 LBS | OXYGEN SATURATION: 95 % | SYSTOLIC BLOOD PRESSURE: 122 MMHG

## 2023-03-17 DIAGNOSIS — E78.2 MIXED HYPERLIPIDEMIA: ICD-10-CM

## 2023-03-17 DIAGNOSIS — E66.01 CLASS 3 SEVERE OBESITY DUE TO EXCESS CALORIES WITH BODY MASS INDEX (BMI) OF 40.0 TO 44.9 IN ADULT, UNSPECIFIED WHETHER SERIOUS COMORBIDITY PRESENT: ICD-10-CM

## 2023-03-17 DIAGNOSIS — I10 ESSENTIAL HYPERTENSION: ICD-10-CM

## 2023-03-17 DIAGNOSIS — I25.10 CORONARY ARTERY DISEASE INVOLVING NATIVE CORONARY ARTERY OF NATIVE HEART WITHOUT ANGINA PECTORIS: Primary | ICD-10-CM

## 2023-03-17 PROCEDURE — 1159F MED LIST DOCD IN RCRD: CPT | Performed by: INTERNAL MEDICINE

## 2023-03-17 PROCEDURE — 1160F RVW MEDS BY RX/DR IN RCRD: CPT | Performed by: INTERNAL MEDICINE

## 2023-03-17 PROCEDURE — 3078F DIAST BP <80 MM HG: CPT | Performed by: INTERNAL MEDICINE

## 2023-03-17 PROCEDURE — 3074F SYST BP LT 130 MM HG: CPT | Performed by: INTERNAL MEDICINE

## 2023-03-17 PROCEDURE — 99214 OFFICE O/P EST MOD 30 MIN: CPT | Performed by: INTERNAL MEDICINE

## 2023-03-17 PROCEDURE — 93000 ELECTROCARDIOGRAM COMPLETE: CPT | Performed by: INTERNAL MEDICINE

## 2023-03-17 RX ORDER — TRAZODONE HYDROCHLORIDE 50 MG/1
TABLET ORAL
COMMUNITY
Start: 2023-02-23

## 2023-03-17 RX ORDER — ACETAMINOPHEN AND CODEINE PHOSPHATE 300; 30 MG/1; MG/1
TABLET ORAL AS NEEDED
COMMUNITY
Start: 2023-02-23

## 2023-03-17 NOTE — PROGRESS NOTES
Referring Provider: Chai Edwards DO    Reason for Follow-up Visit: CASD    Subjective .   Chief Complaint:   Chief Complaint   Patient presents with   • Coronary Artery Disease     Yearly pt states he is doing well with no complaints of symptoms.   • Hypertension     Pt states he does not check at home.   • Hyperlipidemia     Lab done  3/2/23 LDL was 65 on Lipitor 20 mg       History of present illness:  Caron Newman is a 67 y.o. yo male with CAD, s/p PRISCILLA in the past in for routine follow up. He denies any chest pain. Admits to [place] but can not ambulate well       History  Past Medical History:   Diagnosis Date   • CAD (coronary artery disease)    • Cancer (HCC)     skin   • Hyperchloremia    • Hyperlipidemia    • Hypertension    • Left leg pain    • Low back pain    • Myocardial infarction (HCC)    • Peripheral vascular disease (HCC)    • Retention, urine    • S/P coronary artery stent placement     right side   • Sleep apnea    ,   Past Surgical History:   Procedure Laterality Date   • ANKLE ARTHROPLASTY     • BACK SURGERY  03/2018    L4-L5  Dr. Owens   • CARDIAC CATHETERIZATION     • CARDIAC CATHETERIZATION N/A 03/20/2019    Procedure: Left Heart Cath;  Surgeon: Marcio Helton MD;  Location:  [facility];  Service: Cardiology   • CORONARY STENT PLACEMENT     • FOOT SURGERY Left    • HERNIA REPAIR     • REPLACEMENT TOTAL KNEE     • SHOULDER SURGERY     • TOTAL HIP ARTHROPLASTY Left    ,   Family History   Problem Relation Age of Onset   • Cancer Mother    • Hypertension Mother    • Hypertension Father    • Cancer Father    • Immunodeficiency Sister    • Heart failure Brother    • Hypertension Brother    ,   Social History     Tobacco Use   • Smoking status: Never   • Smokeless tobacco: Never   Vaping Use   • Vaping Use: Never used   Substance Use Topics   • Alcohol use: Never   • Drug use: Never   ,     Medications  Current Outpatient Medications   Medication Sig Dispense Refill   •  acetaminophen-codeine (TYLENOL #3) 300-30 MG per tablet As Needed.     • amLODIPine (NORVASC) 5 MG tablet Take 1 tablet by mouth Daily.     • aspirin 81 MG EC tablet Take 1 tablet by mouth Daily.     • atorvastatin (LIPITOR) 20 MG tablet TAKE 1 TABLET EVERY NIGHT AT BEDTIME 90 tablet 3   • carvedilol (COREG) 25 MG tablet Take 1 tablet by mouth 2 (Two) Times a Day With Meals.     • diclofenac (VOLTAREN) 75 MG EC tablet 2 (Two) Times a Day.     • diphenhydrAMINE-acetaminophen (TYLENOL PM)  MG tablet per tablet Take 2 tablets by mouth At Night As Needed for Sleep.     • furosemide (LASIX) 20 MG tablet TAKE 1 TABLET DAILY 90 tablet 3   • lisinopril-hydrochlorothiazide (PRINZIDE,ZESTORETIC) 20-25 MG per tablet Take 1 tablet by mouth Daily.     • magnesium gluconate (MAGONATE) 500 MG tablet Take 500 mg by mouth 2 (Two) Times a Day.     • metFORMIN (GLUCOPHAGE) 500 MG tablet Daily.     • Multiple Vitamin (MULTI-VITAMIN DAILY PO) Take  by mouth.     • Omega-3 Fatty Acids (FISH OIL) 1000 MG capsule capsule Take  by mouth 2 (Two) Times a Day With Meals.     • potassium chloride (K-DUR,KLOR-CON) 10 MEQ CR tablet TAKE 1 TABLET DAILY 90 tablet 3   • tamsulosin (FLOMAX) 0.4 MG capsule 24 hr capsule Take 1 capsule by mouth Every Night. 90 capsule 3   • traZODone (DESYREL) 50 MG tablet every night at bedtime.     • diclofenac-miSOPROStol (ARTHROTEC 75) 75-0.2 MG EC tablet Take 1 tablet by mouth 2 (Two) Times a Day.     • gabapentin (NEURONTIN) 300 MG capsule Take 1 capsule by mouth 3 (Three) Times a Day.     • gabapentin (Neurontin) 300 MG capsule Take 1 capsule by mouth Every Night. 30 capsule 2     No current facility-administered medications for this visit.       Allergies:  Ciprofloxacin    Review of Systems  Review of Systems   HENT: Negative for nosebleeds.    Cardiovascular: Positive for dyspnea on exertion. Negative for chest pain, claudication, leg swelling, near-syncope, orthopnea, palpitations, paroxysmal nocturnal  "dyspnea and syncope.   Respiratory: Negative for hemoptysis and shortness of breath.    Musculoskeletal: Positive for back pain.   Gastrointestinal: Negative for melena.   Genitourinary: Negative for hematuria.       Objective     Physical Exam:  /70   Pulse 61   Ht 172.7 cm (68\")   Wt 118 kg (261 lb)   SpO2 95%   BMI 39.68 kg/m²   Pulmonary:      Effort: Pulmonary effort is normal.      Breath sounds: Normal breath sounds.   Cardiovascular:      Normal rate. Regular rhythm.      Murmurs: There is no murmur.   Edema:     Peripheral edema absent.         Results Review:    ECG 12 Lead    Date/Time: 3/17/2023 10:13 AM  Performed by: Marcio Helton MD  Authorized by: Marcio Helton MD   Comparison: compared with previous ECG   Similar to previous ECG  Rhythm: sinus bradycardia  Rate: bradycardic  Conduction: 1st degree AV block  ST Segments: ST segments normal  T Waves: T waves normal  QRS axis: normal  Other findings: poor R wave progression    Clinical impression: non-specific ECG            Admission on 03/20/2019, Discharged on 03/20/2019   Component Date Value Ref Range Status   • Glucose 03/20/2019 102 (H)  70 - 100 mg/dL Final   • BUN 03/20/2019 23 (H)  5 - 21 mg/dL Final   • Creatinine 03/20/2019 0.86  0.50 - 1.40 mg/dL Final   • Sodium 03/20/2019 139  135 - 145 mmol/L Final   • Potassium 03/20/2019 4.2  3.5 - 5.3 mmol/L Final   • Chloride 03/20/2019 101  98 - 110 mmol/L Final   • CO2 03/20/2019 30.0  24.0 - 31.0 mmol/L Final   • Calcium 03/20/2019 8.9  8.4 - 10.4 mg/dL Final   • eGFR Non African Amer 03/20/2019 89  >60 mL/min/1.73 Final   • BUN/Creatinine Ratio 03/20/2019 26.7 (H)  7.0 - 25.0 Final   • Anion Gap 03/20/2019 8.0  4.0 - 13.0 mmol/L Final   • Protime 03/20/2019 14.0  11.9 - 14.6 Seconds Final   • INR 03/20/2019 1.05  0.91 - 1.09 Final   • WBC 03/20/2019 5.99  4.80 - 10.80 10*3/mm3 Final   • RBC 03/20/2019 4.55 (L)  4.80 - 5.90 10*6/mm3 Final   • Hemoglobin 03/20/2019 13.9 (L)  14.0 - " 18.0 g/dL Final   • Hematocrit 03/20/2019 40.3  40.0 - 52.0 % Final   • MCV 03/20/2019 88.6  82.0 - 95.0 fL Final   • MCH 03/20/2019 30.5  28.0 - 32.0 pg Final   • MCHC 03/20/2019 34.5  33.0 - 36.0 g/dL Final   • RDW 03/20/2019 13.3  12.0 - 15.0 % Final   • RDW-SD 03/20/2019 43.1  40.0 - 54.0 fl Final   • MPV 03/20/2019 8.8  6.0 - 12.0 fL Final   • Platelets 03/20/2019 222  130 - 400 10*3/mm3 Final   • Neutrophil % 03/20/2019 67.9  39.0 - 78.0 % Final   • Lymphocyte % 03/20/2019 17.7  15.0 - 45.0 % Final   • Monocyte % 03/20/2019 10.4  4.0 - 12.0 % Final   • Eosinophil % 03/20/2019 3.2  0.0 - 4.0 % Final   • Basophil % 03/20/2019 0.5  0.0 - 2.0 % Final   • Immature Grans % 03/20/2019 0.3  0.0 - 5.0 % Final   • Neutrophils, Absolute 03/20/2019 4.07  1.87 - 8.40 10*3/mm3 Final   • Lymphocytes, Absolute 03/20/2019 1.06  0.72 - 4.86 10*3/mm3 Final   • Monocytes, Absolute 03/20/2019 0.62  0.19 - 1.30 10*3/mm3 Final   • Eosinophils, Absolute 03/20/2019 0.19  0.00 - 0.70 10*3/mm3 Final   • Basophils, Absolute 03/20/2019 0.03  0.00 - 0.20 10*3/mm3 Final   • Immature Grans, Absolute 03/20/2019 0.02  0.00 - 0.05 10*3/mm3 Final   • nRBC 03/20/2019 0.0  0.0 - 0.0 /100 WBC Final       Assessment & Plan   Problem List Items Addressed This Visit        Cardiac and Vasculature    Coronary artery disease involving native coronary artery of native heart without angina pectoris    Current Assessment & Plan     No obvious ischemia         Essential hypertension    Current Assessment & Plan     Good control         Mixed hyperlipidemia - Primary    Current Assessment & Plan     Good response to statin            Endocrine and Metabolic    Class 3 severe obesity due to excess calories with body mass index (BMI) of 40.0 to 44.9 in adult (HCC)    Current Assessment & Plan     Has lost 11 lbs            Medical Complexity  must have 1 out of 3     Moderate Complexity Level 3           1 of the following medical problems:          []One  chronic illness with mild exacerbation         [x]Two or more stable chronic illness          []One new problem  []One acute illness with systemic symptoms    Complexity of Data  Reviewed (1 out of the 3 following categories)      Category 1 tests, documents, historian (must have 3 points)       []Review of prior external records  [x]Review of results of unique tests  []Ordering unique tests  []Assessment requires an independent historian    Category 2 Interpretation of tests   []Independent interpretation of test read by another doc    Category 3 Discuss Management/tests  []Discussion with external physician    Risk of complications and/or morbidity          [x]Prescription Drug Management

## 2023-03-20 ENCOUNTER — TELEPHONE (OUTPATIENT)
Dept: NEUROSURGERY | Age: 68
End: 2023-03-20

## 2023-03-20 NOTE — TELEPHONE ENCOUNTER
[facility] Neurosurgery New Patient Questionnaire    Diagnosis/Reason for Referral?    Postlaminectomy syndrome, lumbar region  M54.50, G89.29 (ICD-10-CM) - Chronic bilateral low back pain without sciatica  SCS IMPLANT    2. Who is completing questionnaire? Patient  Caregiver Family      3. Has the patient had any previous spinal/brain surgeries? YES BACK       A. If yes, what is the name of the facility in which the surgery was performed? [facility] Procedure/Surgery performed? C. Who was the surgeon? RAVI RAYMOND When was the surgery? 3/13/18       E. Did the patient improve after the surgery? YES      4. Is this a second opinion? If yes, Dr. Cecil Swanson would like to review patient first before making the appointment. NO    5. Have MRI Images been obtain within the last year? Yes  No      XR  CT     If yes, where was the imaging performed? [facility]   If yes, what part of the body? Lumbar  Cervical  Thoracic  Brain     If yes, when was it obtained? MM/YY    Note: if the scan was performed at a facility other than [facility], the disc will need to be brought to the appointment or we need to reach out to obtain the disc. A. Was the patient instructed to provide the disc? Yes   No      8. Has the patient had a NCV/EMG within the last year? Yes  No     If yes, where was it performed and date? MM/YY  Location:      9. Has the patient been to Physical Therapy? Yes  No     If yes, what location, how long attended, and last visit? Location: [place]       Therapy Lasted: 35 TIMES IN LAST YEAR   Date of Last Visit:      10. Has the patient been to Pain Management? Yes  No     If yes, what location and last visit     [facility]   Last Visit:   Is it helping?

## 2023-03-28 ENCOUNTER — OFFICE VISIT (OUTPATIENT)
Dept: NEUROSURGERY | Age: 68
End: 2023-03-28
Payer: MEDICARE

## 2023-03-28 VITALS
HEART RATE: 80 BPM | RESPIRATION RATE: 18 BRPM | DIASTOLIC BLOOD PRESSURE: 80 MMHG | BODY MASS INDEX: 40.92 KG/M2 | SYSTOLIC BLOOD PRESSURE: 138 MMHG | WEIGHT: 270 LBS | HEIGHT: 68 IN

## 2023-03-28 DIAGNOSIS — R29.898 LEFT LEG WEAKNESS: ICD-10-CM

## 2023-03-28 DIAGNOSIS — M54.16 LUMBAR RADICULOPATHY: ICD-10-CM

## 2023-03-28 DIAGNOSIS — M96.1 FAILED BACK SYNDROME: Primary | ICD-10-CM

## 2023-03-28 PROCEDURE — 99203 OFFICE O/P NEW LOW 30 MIN: CPT | Performed by: NEUROLOGICAL SURGERY

## 2023-03-28 PROCEDURE — 1123F ACP DISCUSS/DSCN MKR DOCD: CPT | Performed by: NEUROLOGICAL SURGERY

## 2023-03-28 RX ORDER — POTASSIUM CHLORIDE 750 MG/1
CAPSULE, EXTENDED RELEASE ORAL
COMMUNITY
Start: 2023-03-01

## 2023-03-28 RX ORDER — PRASUGREL 10 MG/1
TABLET, FILM COATED ORAL
COMMUNITY

## 2023-03-28 RX ORDER — POTASSIUM CHLORIDE 750 MG/1
1 TABLET, EXTENDED RELEASE ORAL DAILY
COMMUNITY
Start: 2019-03-18

## 2023-03-28 RX ORDER — GABAPENTIN 300 MG/1
300 CAPSULE ORAL NIGHTLY
COMMUNITY
Start: 2022-05-04

## 2023-03-28 RX ORDER — TRAZODONE HYDROCHLORIDE 50 MG/1
TABLET ORAL
COMMUNITY
Start: 2023-02-23

## 2023-03-28 RX ORDER — NEOMYCIN SULFATE, POLYMYXIN B SULFATE AND HYDROCORTISONE 10; 3.5; 1 MG/ML; MG/ML; [USP'U]/ML
SUSPENSION/ DROPS AURICULAR (OTIC)
COMMUNITY
Start: 2023-02-25

## 2023-03-28 RX ORDER — ACETAMINOPHEN AND CODEINE PHOSPHATE 300; 30 MG/1; MG/1
TABLET ORAL
COMMUNITY
Start: 2023-02-23

## 2023-03-28 RX ORDER — ACETAMINOPHEN,DIPHENHYDRAMINE HCL 500; 25 MG/1; MG/1
2 TABLET, FILM COATED ORAL NIGHTLY PRN
COMMUNITY

## 2023-03-28 ASSESSMENT — ENCOUNTER SYMPTOMS
GASTROINTESTINAL NEGATIVE: 1
EYES NEGATIVE: 1
RESPIRATORY NEGATIVE: 1

## 2023-03-28 NOTE — PROGRESS NOTES
[id number] [facility] Neurosurgery  Office Visit        Chief Complaint   Patient presents with    New Patient     Establishing care    Chronic Pain     Patient is here to discuss SCS implant and has a successful Medtronic trial at [facility]. He states that the trial helped somewhat but also states that he does not have any pain today. Results     Imaging pushed    Numbness     Patient denies numbness or tingling but states that he has weakness in LLE. HISTORY OF PRESENT ILLNESS:      The patient is a 69 y.o. male who presents as a referral from Dr. Schwartz Half team in pain management to discuss implantation of a spinal cord stimulator. He has a longstanding history of back problem with previous L4/5 XLIF and instrumentation performed by Dr. Martha Wagner in 2018. He also has a history of scoliosis and has seen Dr. Oswaldo Castro and he advised against corrective surgery due to the surgical morbidity involved. He recently underwent a trial of spinal cord stimulation using a Medtronic system and he reports this did provide ~70% relief of his back and leg pain. He does complain of weakness in his left leg that is equally concerning to him as his back pain. He reports difficulty walking and that he has started using a walker for assistance. He has a history of prior right ankle fusion and states this gives him significant difficulty with his gait.        MEDICAL HISTORY:             Past Medical History:   Diagnosis Date    CAD (coronary artery disease) 10/2014    STENT X 1    DDD (degenerative disc disease), lumbar 3/13/2018    Enlarged prostate     Hyperlipidemia     Hypertension     Spondylolisthesis at L4-L5 level 3/13/2018       Past Surgical History:   Procedure Laterality Date    ANKLE FUSION Right 2011    CARDIAC SURGERY  2014    angioplasty with stents    HERNIA REPAIR      JOINT REPLACEMENT Left 2016    knee    [place] ARTHRD ANT INTERBODY  [facility] LUMBAR Left 3/13/2018    L4-5 LLIF performed by Bob Busch MD at [facility]

## 2023-04-17 ENCOUNTER — OFFICE VISIT (OUTPATIENT)
Dept: NEUROSURGERY | Age: 68
End: 2023-04-17
Payer: MEDICARE

## 2023-04-17 VITALS
DIASTOLIC BLOOD PRESSURE: 85 MMHG | HEART RATE: 80 BPM | SYSTOLIC BLOOD PRESSURE: 122 MMHG | HEIGHT: 68 IN | RESPIRATION RATE: 18 BRPM | WEIGHT: 270 LBS | BODY MASS INDEX: 40.92 KG/M2

## 2023-04-17 DIAGNOSIS — Z01.818 PRE-OP TESTING: Primary | ICD-10-CM

## 2023-04-17 DIAGNOSIS — M96.1 FAILED BACK SYNDROME: Primary | ICD-10-CM

## 2023-04-17 DIAGNOSIS — M54.16 LUMBAR RADICULOPATHY: ICD-10-CM

## 2023-04-17 PROCEDURE — 99215 OFFICE O/P EST HI 40 MIN: CPT | Performed by: NEUROLOGICAL SURGERY

## 2023-04-17 PROCEDURE — 1123F ACP DISCUSS/DSCN MKR DOCD: CPT | Performed by: NEUROLOGICAL SURGERY

## 2023-04-17 ASSESSMENT — ENCOUNTER SYMPTOMS
BACK PAIN: 1
GASTROINTESTINAL NEGATIVE: 1
EYES NEGATIVE: 1
RESPIRATORY NEGATIVE: 1

## 2023-04-17 NOTE — PROGRESS NOTES
[facility] Neurosurgery  Office Visit        Chief Complaint   Patient presents with    Follow-up     Patient is here to follow up after imaging and states that his back pain is the same as last visit. Results     MRI T imaging pushed, report in media       HISTORY OF PRESENT ILLNESS:      Interval Update:    Patient returns to review updated MRI imaging. He states he had both the thoracic and lumbar MRIs completed, however only the thoracic MRI is available to review. His complaints are unchanged. HPI:    The patient is a 69 y.o. male who presents as a referral from Dr. Shantal Diaz team in pain management to discuss implantation of a spinal cord stimulator. He has a longstanding history of back problem with previous L4/5 XLIF and instrumentation performed by Dr. Amber Lacy in 2018. He also has a history of scoliosis and has seen Dr. Maryam Leiva and he advised against corrective surgery due to the surgical morbidity involved. He recently underwent a trial of spinal cord stimulation using a Medtronic system and he reports this did provide ~70% relief of his back and leg pain. He does complain of weakness in his left leg that is equally concerning to him as his back pain. He reports difficulty walking and that he has started using a walker for assistance. He has a history of prior right ankle fusion and states this gives him significant difficulty with his gait.        MEDICAL HISTORY:             Past Medical History:   Diagnosis Date    CAD (coronary artery disease) 10/2014    STENT X 1    DDD (degenerative disc disease), lumbar 3/13/2018    Enlarged prostate     Hyperlipidemia     Hypertension     Spondylolisthesis at L4-L5 level 3/13/2018       Past Surgical History:   Procedure Laterality Date    ANKLE FUSION Right 2011    CARDIAC SURGERY  2014    angioplasty with stents    HERNIA REPAIR      JOINT REPLACEMENT Left 2016    knee    [place] ARTHRD ANT INTERBODY  [facility] LUMBAR Left 3/13/2018    L4-5 LLIF performed by Joaquín BISHOP

## 2023-04-24 ENCOUNTER — TELEPHONE (OUTPATIENT)
Dept: CARDIOLOGY | Facility: CLINIC | Age: 68
End: 2023-04-24
Payer: MEDICARE

## 2023-04-24 NOTE — TELEPHONE ENCOUNTER
He is having a nerve stimulator placed.  Needs to be cardiac cleared.  Please advise. Bassam Dickson, CMA

## 2023-04-24 NOTE — TELEPHONE ENCOUNTER
Caller: Caron Newman    Relationship: Self    Best call back number: [phone]    What form or medical record are you requesting: CARDIAC CLEARANCE     Who is requesting this form or medical record from you: DR. PITT     How would you like to receive the form or medical records (pick-up, mail, fax): FAX   If fax, what is the fax number: PT UNSURE OF FAX NUMBER       Timeframe paperwork needed: BEFORE NEXT WEEK     Additional notes: DR. PITT IS AT [facility] AND PT NEEDS CARDIAC CLEARANCE. BEFORE THE 2ND IF POSSIBLE

## 2023-04-25 ENCOUNTER — TELEPHONE (OUTPATIENT)
Dept: NEUROSURGERY | Age: 68
End: 2023-04-25

## 2023-04-26 ENCOUNTER — HOSPITAL ENCOUNTER (OUTPATIENT)
Dept: GENERAL RADIOLOGY | Age: 68
Discharge: HOME | End: 2023-04-26
Payer: MEDICARE

## 2023-04-26 ENCOUNTER — HOSPITAL ENCOUNTER (OUTPATIENT)
Dept: PREADMISSION TESTING | Age: 68
Discharge: HOME | End: 2023-04-30
Payer: MEDICARE

## 2023-04-26 VITALS — BODY MASS INDEX: 39.25 KG/M2 | WEIGHT: 259 LBS | HEIGHT: 68 IN

## 2023-04-26 DIAGNOSIS — Z01.818 PRE-OP TESTING: ICD-10-CM

## 2023-04-26 LAB
ALBUMIN SERPL-MCNC: 4.3 G/DL (ref 3.5–5.2)
ALP SERPL-CCNC: 86 U/L (ref 40–130)
ALT SERPL-CCNC: 33 U/L (ref 5–41)
ANION GAP SERPL CALCULATED.3IONS-SCNC: 11 MMOL/L (ref 7–19)
APTT PPP: 30.4 SEC (ref 26–36.2)
AST SERPL-CCNC: 35 U/L (ref 5–40)
BASOPHILS # BLD: 0 K/UL (ref 0–0.2)
BASOPHILS NFR BLD: 0.5 % (ref 0–1)
BILIRUB SERPL-MCNC: 0.4 MG/DL (ref 0.2–1.2)
BILIRUB UR QL STRIP: NEGATIVE
BUN SERPL-MCNC: 25 MG/DL (ref 8–23)
CALCIUM SERPL-MCNC: 9.4 MG/DL (ref 8.8–10.2)
CHLORIDE SERPL-SCNC: 103 MMOL/L (ref 98–111)
CLARITY UR: CLEAR
CO2 SERPL-SCNC: 28 MMOL/L (ref 22–29)
COLOR UR: YELLOW
CREAT SERPL-MCNC: 1.1 MG/DL (ref 0.5–1.2)
EOSINOPHIL # BLD: 0.1 K/UL (ref 0–0.6)
EOSINOPHIL NFR BLD: 1.3 % (ref 0–5)
ERYTHROCYTE [DISTWIDTH] IN BLOOD BY AUTOMATED COUNT: 12.8 % (ref 11.5–14.5)
GLUCOSE SERPL-MCNC: 106 MG/DL (ref 74–109)
GLUCOSE UR STRIP.AUTO-MCNC: NEGATIVE MG/DL
HCT VFR BLD AUTO: 43.9 % (ref 42–52)
HGB BLD-MCNC: 14.6 G/DL (ref 14–18)
HGB UR STRIP.AUTO-MCNC: NEGATIVE MG/L
IMM GRANULOCYTES # BLD: 0 K/UL
INR PPP: 1.11 (ref 0.88–1.18)
KETONES UR STRIP.AUTO-MCNC: NEGATIVE MG/DL
LEUKOCYTE ESTERASE UR QL STRIP.AUTO: NEGATIVE
LYMPHOCYTES # BLD: 1.1 K/UL (ref 1.1–4.5)
LYMPHOCYTES NFR BLD: 13.9 % (ref 20–40)
MCH RBC QN AUTO: 31.1 PG (ref 27–31)
MCHC RBC AUTO-ENTMCNC: 33.3 G/DL (ref 33–37)
MCV RBC AUTO: 93.6 FL (ref 80–94)
MONOCYTES # BLD: 0.9 K/UL (ref 0–0.9)
MONOCYTES NFR BLD: 10.8 % (ref 0–10)
NEUTROPHILS # BLD: 5.8 K/UL (ref 1.5–7.5)
NEUTS SEG NFR BLD: 73.1 % (ref 50–65)
NITRITE UR QL STRIP.AUTO: NEGATIVE
PH UR STRIP.AUTO: 6 [PH] (ref 5–8)
PLATELET # BLD AUTO: 217 K/UL (ref 130–400)
PMV BLD AUTO: 9.3 FL (ref 9.4–12.4)
POTASSIUM SERPL-SCNC: 3.5 MMOL/L (ref 3.5–5)
PROT SERPL-MCNC: 7 G/DL (ref 6.6–8.7)
PROT UR STRIP.AUTO-MCNC: NEGATIVE MG/DL
PROTHROMBIN TIME: 14.3 SEC (ref 12–14.6)
RBC # BLD AUTO: 4.69 M/UL (ref 4.7–6.1)
SODIUM SERPL-SCNC: 142 MMOL/L (ref 136–145)
SP GR UR STRIP.AUTO: 1.01 (ref 1–1.03)
UROBILINOGEN UR STRIP.AUTO-MCNC: 0.2 E.U./DL
WBC # BLD AUTO: 7.9 K/UL (ref 4.8–10.8)

## 2023-04-26 PROCEDURE — 81003 URINALYSIS AUTO W/O SCOPE: CPT

## 2023-04-26 PROCEDURE — 85025 COMPLETE CBC W/AUTO DIFF WBC: CPT

## 2023-04-26 PROCEDURE — 80053 COMPREHEN METABOLIC PANEL: CPT

## 2023-04-26 PROCEDURE — 85610 PROTHROMBIN TIME: CPT

## 2023-04-26 PROCEDURE — 71046 X-RAY EXAM CHEST 2 VIEWS: CPT

## 2023-04-26 PROCEDURE — 85730 THROMBOPLASTIN TIME PARTIAL: CPT

## 2023-04-26 RX ORDER — ACETAMINOPHEN/DIPHENHYDRAMINE 500MG-25MG
2 TABLET ORAL NIGHTLY
COMMUNITY

## 2023-04-26 NOTE — TELEPHONE ENCOUNTER
Pt informed to stay on the asa during this procedure. A clearance letter will be sent to Dr. Gold.   Bassam Dickson, CMA

## 2023-05-02 ENCOUNTER — ANESTHESIA EVENT (OUTPATIENT)
Dept: OPERATING ROOM | Age: 68
End: 2023-05-02
Payer: MEDICARE

## 2023-05-02 ENCOUNTER — ANESTHESIA (OUTPATIENT)
Dept: OPERATING ROOM | Age: 68
End: 2023-05-02
Payer: MEDICARE

## 2023-05-02 ENCOUNTER — HOSPITAL ENCOUNTER (OUTPATIENT)
Age: 68
Setting detail: OUTPATIENT SURGERY
Discharge: HOME | End: 2023-05-02
Attending: NEUROLOGICAL SURGERY | Admitting: NEUROLOGICAL SURGERY
Payer: MEDICARE

## 2023-05-02 VITALS
HEIGHT: 68 IN | BODY MASS INDEX: 39.25 KG/M2 | SYSTOLIC BLOOD PRESSURE: 147 MMHG | WEIGHT: 259 LBS | OXYGEN SATURATION: 93 % | RESPIRATION RATE: 21 BRPM | DIASTOLIC BLOOD PRESSURE: 88 MMHG | TEMPERATURE: 98.2 F | HEART RATE: 56 BPM

## 2023-05-02 PROCEDURE — 2580000003 HC RX 258: Performed by: NEUROLOGICAL SURGERY

## 2023-05-02 RX ORDER — SODIUM CHLORIDE, SODIUM LACTATE, POTASSIUM CHLORIDE, CALCIUM CHLORIDE 600; 310; 30; 20 MG/100ML; MG/100ML; MG/100ML; MG/100ML
INJECTION, SOLUTION INTRAVENOUS CONTINUOUS
Status: DISCONTINUED | OUTPATIENT
Start: 2023-05-02 | End: 2023-05-03 | Stop reason: HOSPADM

## 2023-05-02 RX ADMIN — SODIUM CHLORIDE, SODIUM LACTATE, POTASSIUM CHLORIDE, AND CALCIUM CHLORIDE: 600; 310; 30; 20 INJECTION, SOLUTION INTRAVENOUS at 12:31

## 2023-05-02 ASSESSMENT — PAIN - FUNCTIONAL ASSESSMENT: PAIN_FUNCTIONAL_ASSESSMENT: NONE - DENIES PAIN

## 2023-05-02 ASSESSMENT — LIFESTYLE VARIABLES: SMOKING_STATUS: 0

## 2023-05-02 NOTE — PROGRESS NOTES
DR Caty Alejandro AT BEDSIDE AND SPOKE WITH PATIENT AND FAMILY, SURGERY CANCELLED, PIV DC'D, CANNULA INTACT, DRESSING APPLIED, PATIENT TO CONTACT OFFICE, GAIT STEADY, NO DISTRESS NOTED - DISCHARGED HOME

## 2023-05-02 NOTE — ANESTHESIA PRE PROCEDURE
Department of Anesthesiology  Preprocedure Note       Name:  Brooks Almaraz   Age:  69 y.o.  :  1955                                          MRN:  [id number]         Date:  2023      Surgeon: Lesia Jiménez):  Kat Smith MD    Procedure: Procedure(s):  L4-5 LLIF  POSTEIROR SPINAL FUSION WITH INSTRUMENTATION    Medications prior to admission:   Prior to Admission medications    Medication Sig Start Date End Date Taking? Authorizing Provider   diphenhydrAMINE-APAP, sleep, (TYLENOL PM EXTRA STRENGTH)  MG tablet Take 2 tablets by mouth at bedtime    Historical Provider, MD   Diclofenac Sodium 100 MG TB24 Take 100 mg by mouth 2 times daily    Historical Provider, MD   metFORMIN (GLUCOPHAGE-XR) 500 MG extended release tablet Take 1 tablet by mouth daily (with breakfast) Indications: Weight Loss    Historical Provider, MD   lisinopril-hydrochlorothiazide (PRINZIDE;ZESTORETIC) 20-25 MG per tablet Take 1 tablet by mouth daily    Historical Provider, MD   furosemide (LASIX) 20 MG tablet Take 1 tablet by mouth daily    Historical Provider, MD   magnesium gluconate (MAGONATE) 500 MG tablet Take 2 tablets by mouth nightly    Historical Provider, MD   traMADol (ULTRAM) 50 MG tablet Take 1 tablet by mouth every 6 hours as needed for Pain.     Historical Provider, MD   acetaminophen (TYLENOL) 500 MG tablet Take 2 tablets by mouth every 6 hours as needed    Historical Provider, MD   carvedilol (COREG) 25 MG tablet Take 1 tablet by mouth 2 times daily (with meals)    Historical Provider, MD   Multiple Vitamins-Minerals (THERAPEUTIC MULTIVITAMIN-MINERALS) tablet Take 1 tablet by mouth daily    Historical Provider, MD   atorvastatin (LIPITOR) 20 MG tablet Take 1 tablet by mouth nightly    Historical Provider, MD   aspirin 81 MG tablet Take 1 tablet by mouth daily    Historical Provider, MD   tamsulosin (FLOMAX) 0.4 MG capsule Take 1 capsule by mouth nightly    Historical Provider, MD   amLODIPine (NORVASC) 5 MG tablet

## 2023-05-02 NOTE — PROGRESS NOTES
NEUROSURGERY    In interviewing the patient prior to surgery, he reports that he was instructed by cardiology not to stop his baby Aspirin. Our team was not made aware of this. I advised him that this surgery would not be safe with him taking aspirin. We will cancel surgery and follow up with his cardiologist to determine if his cardiac risk can be modified in a way that would allow him to stop his aspirin and eventually proceed with surgery.

## 2023-05-04 ENCOUNTER — TELEPHONE (OUTPATIENT)
Dept: NEUROSURGERY | Age: 68
End: 2023-05-04

## 2023-05-04 NOTE — TELEPHONE ENCOUNTER
Patient called in asking how Dr. Mendel Maul wanted him to proceed in order to get his procedure done. I reviewed the options given below by Dr Mendel Maul and explained them all to the patient. Patient states he wishes to continue receiving care from Dr Mendel Maul and would like to undergo surgery with Dr Mendel Maul as well. Patient stated he would either s/w Dr Richmond Kerr directly or make a follow up appt to see him and discuss the risks of holding his ASA as well as ask him to possibly change the clearance letter to allow him to hold the ASA. Patient will give us a call back after he is able to s/w Dr Richmond Kerr to update and possibly get on the surgery schedule.

## 2023-05-04 NOTE — TELEPHONE ENCOUNTER
Letter from Dr Saul Garza in media. Dr Saul Garza stated that patient is not to hold Aspirin. Please advise on how to advance. Will he need to find another surgeon?

## 2023-05-09 ENCOUNTER — TELEPHONE (OUTPATIENT)
Dept: NEUROSURGERY | Age: 68
End: 2023-05-09

## 2023-05-09 NOTE — TELEPHONE ENCOUNTER
Patient called stating that he would like to proceed with surgery with Dr Mago Galeana. He stated that he would like to go against what Dr Keyon Brown stated and he will hold his own aspirin against advise. How many days hold ASA per Dr Mago Galeana? Does he need a follow up appointment with Dr Mago Galeana to rediscuss surgery?

## 2023-05-22 ENCOUNTER — OFFICE VISIT (OUTPATIENT)
Dept: NEUROSURGERY | Age: 68
End: 2023-05-22
Payer: MEDICARE

## 2023-05-22 VITALS
DIASTOLIC BLOOD PRESSURE: 85 MMHG | HEART RATE: 80 BPM | HEIGHT: 68 IN | BODY MASS INDEX: 39.25 KG/M2 | WEIGHT: 259 LBS | RESPIRATION RATE: 18 BRPM | SYSTOLIC BLOOD PRESSURE: 145 MMHG

## 2023-05-22 DIAGNOSIS — M54.16 LUMBAR RADICULOPATHY: ICD-10-CM

## 2023-05-22 DIAGNOSIS — M41.9 SCOLIOSIS OF THORACOLUMBAR SPINE, UNSPECIFIED SCOLIOSIS TYPE: ICD-10-CM

## 2023-05-22 DIAGNOSIS — M48.062 SPINAL STENOSIS OF LUMBAR REGION WITH NEUROGENIC CLAUDICATION: ICD-10-CM

## 2023-05-22 DIAGNOSIS — M96.1 FAILED BACK SYNDROME: Primary | ICD-10-CM

## 2023-05-22 PROCEDURE — 1123F ACP DISCUSS/DSCN MKR DOCD: CPT | Performed by: NEUROLOGICAL SURGERY

## 2023-05-22 PROCEDURE — 99215 OFFICE O/P EST HI 40 MIN: CPT | Performed by: NEUROLOGICAL SURGERY

## 2023-05-22 ASSESSMENT — ENCOUNTER SYMPTOMS
RESPIRATORY NEGATIVE: 1
EYES NEGATIVE: 1
GASTROINTESTINAL NEGATIVE: 1
BACK PAIN: 1

## 2023-05-22 NOTE — PROGRESS NOTES
[facility] Neurosurgery  Office Visit        Chief Complaint   Patient presents with    Follow-up     Patient is here to re discuss surgery. He is on the surgery schedule for 6/6/23 and has decided that he is going to hold his ASA against Dr Lily Marin advice. HISTORY OF PRESENT ILLNESS:      Interval Update:    Patient returns for follow-up to discuss rescheduling surgery. His surgery (thoracic laminectomy for implantation of spinal cord stimulator) was cancelled due to not stopping ASA. He was apparently told by his cardiologist that he will never be cleared to stop aspirin for any reason. His complaints are unchanged and he states he has decided to proceed with surgery (and holding his aspirin) despite the recommendations of his cardiologist.      HPI:    The patient is a 69 y.o. male who presents as a referral from Dr. Summer Ramírez team in pain management to discuss implantation of a spinal cord stimulator. He has a longstanding history of back problem with previous L4/5 XLIF and instrumentation performed by Dr. Jason Catalan in 2018. He also has a history of scoliosis and has seen Dr. Rosie Maynard and he advised against corrective surgery due to the surgical morbidity involved. He recently underwent a trial of spinal cord stimulation using a Medtronic system and he reports this did provide ~70% relief of his back and leg pain. He does complain of weakness in his left leg that is equally concerning to him as his back pain. He reports difficulty walking and that he has started using a walker for assistance. He has a history of prior right ankle fusion and states this gives him significant difficulty with his gait.        MEDICAL HISTORY:             Past Medical History:   Diagnosis Date    Back pain     CAD (coronary artery disease) 10/2014    STENT X 1; sees dr. Margarita Matias    DDD (degenerative disc disease), lumbar 03/13/2018    Enlarged prostate     Failed back syndrome of thoracic spine     Hyperlipidemia     Hypertension

## 2023-06-06 ENCOUNTER — ANESTHESIA (OUTPATIENT)
Dept: OPERATING ROOM | Age: 68
End: 2023-06-06
Payer: MEDICARE

## 2023-06-06 ENCOUNTER — ANESTHESIA EVENT (OUTPATIENT)
Dept: OPERATING ROOM | Age: 68
End: 2023-06-06
Payer: MEDICARE

## 2023-06-06 ENCOUNTER — APPOINTMENT (OUTPATIENT)
Dept: GENERAL RADIOLOGY | Age: 68
End: 2023-06-06
Attending: NEUROLOGICAL SURGERY
Payer: MEDICARE

## 2023-06-06 ENCOUNTER — HOSPITAL ENCOUNTER (OUTPATIENT)
Age: 68
Setting detail: OUTPATIENT SURGERY
Discharge: HOME | End: 2023-06-06
Attending: NEUROLOGICAL SURGERY | Admitting: NEUROLOGICAL SURGERY
Payer: MEDICARE

## 2023-06-06 VITALS
SYSTOLIC BLOOD PRESSURE: 151 MMHG | RESPIRATION RATE: 16 BRPM | HEART RATE: 59 BPM | DIASTOLIC BLOOD PRESSURE: 88 MMHG | HEIGHT: 68 IN | WEIGHT: 255 LBS | TEMPERATURE: 97.3 F | BODY MASS INDEX: 38.65 KG/M2 | OXYGEN SATURATION: 94 %

## 2023-06-06 DIAGNOSIS — M96.1 FAILED BACK SYNDROME: Primary | ICD-10-CM

## 2023-06-06 PROCEDURE — 2500000003 HC RX 250 WO HCPCS: Performed by: NURSE ANESTHETIST, CERTIFIED REGISTERED

## 2023-06-06 PROCEDURE — 2500000003 HC RX 250 WO HCPCS: Performed by: NEUROLOGICAL SURGERY

## 2023-06-06 PROCEDURE — 2580000003 HC RX 258: Performed by: NEUROLOGICAL SURGERY

## 2023-06-06 PROCEDURE — 7100000001 HC PACU RECOVERY - ADDTL 15 MIN: Performed by: NEUROLOGICAL SURGERY

## 2023-06-06 PROCEDURE — 3600000014 HC SURGERY LEVEL 4 ADDTL 15MIN: Performed by: NEUROLOGICAL SURGERY

## 2023-06-06 PROCEDURE — 3700000000 HC ANESTHESIA ATTENDED CARE: Performed by: NEUROLOGICAL SURGERY

## 2023-06-06 PROCEDURE — 2580000003 HC RX 258: Performed by: ANESTHESIOLOGY

## 2023-06-06 PROCEDURE — A4217 STERILE WATER/SALINE, 500 ML: HCPCS | Performed by: NEUROLOGICAL SURGERY

## 2023-06-06 PROCEDURE — 7100000010 HC PHASE II RECOVERY - FIRST 15 MIN: Performed by: NEUROLOGICAL SURGERY

## 2023-06-06 PROCEDURE — 2709999900 HC NON-CHARGEABLE SUPPLY: Performed by: NEUROLOGICAL SURGERY

## 2023-06-06 PROCEDURE — 7100000011 HC PHASE II RECOVERY - ADDTL 15 MIN: Performed by: NEUROLOGICAL SURGERY

## 2023-06-06 PROCEDURE — 6360000002 HC RX W HCPCS: Performed by: NURSE ANESTHETIST, CERTIFIED REGISTERED

## 2023-06-06 PROCEDURE — C1894 INTRO/SHEATH, NON-LASER: HCPCS | Performed by: NEUROLOGICAL SURGERY

## 2023-06-06 PROCEDURE — 7100000000 HC PACU RECOVERY - FIRST 15 MIN: Performed by: NEUROLOGICAL SURGERY

## 2023-06-06 PROCEDURE — 3700000001 HC ADD 15 MINUTES (ANESTHESIA): Performed by: NEUROLOGICAL SURGERY

## 2023-06-06 PROCEDURE — 6370000000 HC RX 637 (ALT 250 FOR IP): Performed by: NEUROLOGICAL SURGERY

## 2023-06-06 PROCEDURE — 3600000004 HC SURGERY LEVEL 4 BASE: Performed by: NEUROLOGICAL SURGERY

## 2023-06-06 PROCEDURE — C1778 LEAD, NEUROSTIMULATOR: HCPCS | Performed by: NEUROLOGICAL SURGERY

## 2023-06-06 PROCEDURE — 63685 INS/RPLC SPI NPG/RCVR POCKET: CPT | Performed by: NEUROLOGICAL SURGERY

## 2023-06-06 PROCEDURE — 72070 X-RAY EXAM THORAC SPINE 2VWS: CPT

## 2023-06-06 PROCEDURE — 63655 IMPLANT NEUROELECTRODES: CPT | Performed by: NEUROLOGICAL SURGERY

## 2023-06-06 PROCEDURE — 2720000010 HC SURG SUPPLY STERILE: Performed by: NEUROLOGICAL SURGERY

## 2023-06-06 PROCEDURE — 2580000003 HC RX 258: Performed by: NURSE ANESTHETIST, CERTIFIED REGISTERED

## 2023-06-06 DEVICE — KIT LD L65CM 565 SPC MRI COMPATIBLE NEUROSTIM FOR CHRONIC: Type: IMPLANTABLE DEVICE | Site: SPINE THORACIC | Status: FUNCTIONAL

## 2023-06-06 DEVICE — DEVICE NEUROSTIMULATOR 13.9CC W1.9XH2.2IN 29.1GM RECHRG: Type: IMPLANTABLE DEVICE | Site: SPINE THORACIC | Status: FUNCTIONAL

## 2023-06-06 RX ORDER — SODIUM CHLORIDE 9 MG/ML
INJECTION, SOLUTION INTRAVENOUS PRN
Status: DISCONTINUED | OUTPATIENT
Start: 2023-06-06 | End: 2023-06-06 | Stop reason: HOSPADM

## 2023-06-06 RX ORDER — FENTANYL CITRATE 50 UG/ML
INJECTION, SOLUTION INTRAMUSCULAR; INTRAVENOUS PRN
Status: DISCONTINUED | OUTPATIENT
Start: 2023-06-06 | End: 2023-06-06 | Stop reason: SDUPTHER

## 2023-06-06 RX ORDER — LIDOCAINE HYDROCHLORIDE 20 MG/ML
INJECTION, SOLUTION EPIDURAL; INFILTRATION; INTRACAUDAL; PERINEURAL PRN
Status: DISCONTINUED | OUTPATIENT
Start: 2023-06-06 | End: 2023-06-06 | Stop reason: ALTCHOICE

## 2023-06-06 RX ORDER — PROPOFOL 10 MG/ML
INJECTION, EMULSION INTRAVENOUS PRN
Status: DISCONTINUED | OUTPATIENT
Start: 2023-06-06 | End: 2023-06-06 | Stop reason: SDUPTHER

## 2023-06-06 RX ORDER — HYDROMORPHONE HYDROCHLORIDE 1 MG/ML
0.25 INJECTION, SOLUTION INTRAMUSCULAR; INTRAVENOUS; SUBCUTANEOUS EVERY 5 MIN PRN
Status: DISCONTINUED | OUTPATIENT
Start: 2023-06-06 | End: 2023-06-06 | Stop reason: HOSPADM

## 2023-06-06 RX ORDER — SUCCINYLCHOLINE/SOD CL,ISO/PF 100 MG/5ML
SYRINGE (ML) INTRAVENOUS PRN
Status: DISCONTINUED | OUTPATIENT
Start: 2023-06-06 | End: 2023-06-06 | Stop reason: SDUPTHER

## 2023-06-06 RX ORDER — SODIUM CHLORIDE, SODIUM LACTATE, POTASSIUM CHLORIDE, CALCIUM CHLORIDE 600; 310; 30; 20 MG/100ML; MG/100ML; MG/100ML; MG/100ML
INJECTION, SOLUTION INTRAVENOUS CONTINUOUS PRN
Status: DISCONTINUED | OUTPATIENT
Start: 2023-06-06 | End: 2023-06-06 | Stop reason: SDUPTHER

## 2023-06-06 RX ORDER — BUPIVACAINE HYDROCHLORIDE AND EPINEPHRINE 5; 5 MG/ML; UG/ML
INJECTION, SOLUTION EPIDURAL; INTRACAUDAL; PERINEURAL PRN
Status: DISCONTINUED | OUTPATIENT
Start: 2023-06-06 | End: 2023-06-06 | Stop reason: ALTCHOICE

## 2023-06-06 RX ORDER — EPHEDRINE SULFATE 50 MG/ML
INJECTION, SOLUTION INTRAVENOUS PRN
Status: DISCONTINUED | OUTPATIENT
Start: 2023-06-06 | End: 2023-06-06 | Stop reason: SDUPTHER

## 2023-06-06 RX ORDER — DEXAMETHASONE SODIUM PHOSPHATE 10 MG/ML
INJECTION, SOLUTION INTRAMUSCULAR; INTRAVENOUS PRN
Status: DISCONTINUED | OUTPATIENT
Start: 2023-06-06 | End: 2023-06-06 | Stop reason: SDUPTHER

## 2023-06-06 RX ORDER — HYDROMORPHONE HYDROCHLORIDE 1 MG/ML
0.5 INJECTION, SOLUTION INTRAMUSCULAR; INTRAVENOUS; SUBCUTANEOUS EVERY 5 MIN PRN
Status: DISCONTINUED | OUTPATIENT
Start: 2023-06-06 | End: 2023-06-06 | Stop reason: HOSPADM

## 2023-06-06 RX ORDER — SODIUM CHLORIDE, SODIUM LACTATE, POTASSIUM CHLORIDE, CALCIUM CHLORIDE 600; 310; 30; 20 MG/100ML; MG/100ML; MG/100ML; MG/100ML
INJECTION, SOLUTION INTRAVENOUS CONTINUOUS
Status: DISCONTINUED | OUTPATIENT
Start: 2023-06-06 | End: 2023-06-06 | Stop reason: HOSPADM

## 2023-06-06 RX ORDER — OXYCODONE HYDROCHLORIDE AND ACETAMINOPHEN 5; 325 MG/1; MG/1
1 TABLET ORAL ONCE
Status: COMPLETED | OUTPATIENT
Start: 2023-06-06 | End: 2023-06-06

## 2023-06-06 RX ORDER — CEFAZOLIN SODIUM 1 G/3ML
INJECTION, POWDER, FOR SOLUTION INTRAMUSCULAR; INTRAVENOUS PRN
Status: DISCONTINUED | OUTPATIENT
Start: 2023-06-06 | End: 2023-06-06 | Stop reason: SDUPTHER

## 2023-06-06 RX ORDER — ONDANSETRON 2 MG/ML
INJECTION INTRAMUSCULAR; INTRAVENOUS PRN
Status: DISCONTINUED | OUTPATIENT
Start: 2023-06-06 | End: 2023-06-06 | Stop reason: SDUPTHER

## 2023-06-06 RX ORDER — SODIUM CHLORIDE 0.9 % (FLUSH) 0.9 %
5-40 SYRINGE (ML) INJECTION EVERY 12 HOURS SCHEDULED
Status: DISCONTINUED | OUTPATIENT
Start: 2023-06-06 | End: 2023-06-06 | Stop reason: HOSPADM

## 2023-06-06 RX ORDER — ONDANSETRON 2 MG/ML
4 INJECTION INTRAMUSCULAR; INTRAVENOUS
Status: DISCONTINUED | OUTPATIENT
Start: 2023-06-06 | End: 2023-06-06 | Stop reason: HOSPADM

## 2023-06-06 RX ORDER — LIDOCAINE HYDROCHLORIDE 10 MG/ML
INJECTION, SOLUTION EPIDURAL; INFILTRATION; INTRACAUDAL; PERINEURAL PRN
Status: DISCONTINUED | OUTPATIENT
Start: 2023-06-06 | End: 2023-06-06 | Stop reason: SDUPTHER

## 2023-06-06 RX ORDER — SODIUM CHLORIDE 0.9 % (FLUSH) 0.9 %
5-40 SYRINGE (ML) INJECTION PRN
Status: DISCONTINUED | OUTPATIENT
Start: 2023-06-06 | End: 2023-06-06 | Stop reason: HOSPADM

## 2023-06-06 RX ORDER — ROCURONIUM BROMIDE 10 MG/ML
INJECTION, SOLUTION INTRAVENOUS PRN
Status: DISCONTINUED | OUTPATIENT
Start: 2023-06-06 | End: 2023-06-06 | Stop reason: SDUPTHER

## 2023-06-06 RX ORDER — OXYCODONE HYDROCHLORIDE AND ACETAMINOPHEN 5; 325 MG/1; MG/1
1-2 TABLET ORAL EVERY 4 HOURS PRN
Qty: 36 TABLET | Refills: 0 | Status: SHIPPED | OUTPATIENT
Start: 2023-06-06 | End: 2023-06-09

## 2023-06-06 RX ADMIN — OXYCODONE HYDROCHLORIDE AND ACETAMINOPHEN 1 TABLET: 5; 325 TABLET ORAL at 17:23

## 2023-06-06 RX ADMIN — SODIUM CHLORIDE, SODIUM LACTATE, POTASSIUM CHLORIDE, AND CALCIUM CHLORIDE: 600; 310; 30; 20 INJECTION, SOLUTION INTRAVENOUS at 13:17

## 2023-06-06 RX ADMIN — LIDOCAINE HYDROCHLORIDE 50 MG: 10 INJECTION, SOLUTION EPIDURAL; INFILTRATION; INTRACAUDAL; PERINEURAL at 13:27

## 2023-06-06 RX ADMIN — PROPOFOL 150 MG: 10 INJECTION, EMULSION INTRAVENOUS at 13:27

## 2023-06-06 RX ADMIN — EPHEDRINE SULFATE 10 MG: 50 INJECTION INTRAMUSCULAR; INTRAVENOUS; SUBCUTANEOUS at 14:44

## 2023-06-06 RX ADMIN — DEXAMETHASONE SODIUM PHOSPHATE 5 MG: 10 INJECTION, SOLUTION INTRAMUSCULAR; INTRAVENOUS at 13:48

## 2023-06-06 RX ADMIN — SUGAMMADEX 200 MG: 100 INJECTION, SOLUTION INTRAVENOUS at 15:02

## 2023-06-06 RX ADMIN — FENTANYL CITRATE 50 MCG: 0.05 INJECTION, SOLUTION INTRAMUSCULAR; INTRAVENOUS at 14:25

## 2023-06-06 RX ADMIN — ROCURONIUM BROMIDE 10 MG: 10 INJECTION, SOLUTION INTRAVENOUS at 13:27

## 2023-06-06 RX ADMIN — ONDANSETRON 4 MG: 2 INJECTION INTRAMUSCULAR; INTRAVENOUS at 13:48

## 2023-06-06 RX ADMIN — CEFAZOLIN SODIUM 3 G: 1 INJECTION, POWDER, FOR SOLUTION INTRAMUSCULAR; INTRAVENOUS at 14:07

## 2023-06-06 RX ADMIN — Medication 100 MG: at 13:27

## 2023-06-06 RX ADMIN — FENTANYL CITRATE 50 MCG: 0.05 INJECTION, SOLUTION INTRAMUSCULAR; INTRAVENOUS at 13:27

## 2023-06-06 RX ADMIN — SODIUM CHLORIDE, SODIUM LACTATE, POTASSIUM CHLORIDE, AND CALCIUM CHLORIDE: 600; 310; 30; 20 INJECTION, SOLUTION INTRAVENOUS at 09:34

## 2023-06-06 RX ADMIN — EPHEDRINE SULFATE 10 MG: 50 INJECTION INTRAMUSCULAR; INTRAVENOUS; SUBCUTANEOUS at 13:50

## 2023-06-06 RX ADMIN — FENTANYL CITRATE 50 MCG: 0.05 INJECTION, SOLUTION INTRAMUSCULAR; INTRAVENOUS at 14:10

## 2023-06-06 RX ADMIN — ROCURONIUM BROMIDE 40 MG: 10 INJECTION, SOLUTION INTRAVENOUS at 13:32

## 2023-06-06 ASSESSMENT — PAIN SCALES - GENERAL
PAINLEVEL_OUTOF10: 5
PAINLEVEL_OUTOF10: 6

## 2023-06-06 ASSESSMENT — LIFESTYLE VARIABLES: SMOKING_STATUS: 0

## 2023-06-06 ASSESSMENT — PAIN - FUNCTIONAL ASSESSMENT: PAIN_FUNCTIONAL_ASSESSMENT: 0-10

## 2023-06-06 NOTE — OP NOTE
the incision was opened with the skin knife and carried down to the subcutaneous tissues with electrocautery. Monopolar cautery and Morse elevators were used to elevate paraspinous musculature off the spinous processes of T9, T10, and T11. Self-retaining retractors were then placed and we placed a Kocher clamp on the spinous process and confirmed that we were at the intended T10 level. Using the Leksell rongeur, the spinous process of T10 was removed. The Leksell rongeur and matchstick mary on the high-speed drill were then used to eggshell the bone of the T10 lamina. Kerrison punches were then used to complete the laminectomy taking great care to protect the underlying spinal cord. Once the ligamentum flavum was completely removed and we had adequate exposure, we obtained the dissecting paddle from the Qwilt set and it was passed rostrally under the T9 lamina and its position was confirmed with fluoroscopy. We then obtained a Powerhouse DynamicsScan MRI-compatible 5-6-5 lead. This was handed into field and carefully threaded under the T9 lamina and its position was confirmed with fluoroscopy. Silastic fascial anchors were threaded over the lead wires and left in place. The leads were tacked to the ligamentum flavum with a single 2-O silk suture. Once we had the lead in place, we obtained hemostasis in the epidural space using a combination of surgifoam and bipolar cautery. Once we had hemostasis, we closed the fascia with interrupted O-vicryl suture and a relaxing loop was made in the lead and tacked to the fascia with silk sutures. We then turned our attention to the gluteal incision. The skin was incised with a #15 blade and then combination of blunt dissection and electrocautery were used to create an appropriately sized pocket.   We then used a tunneling sheath to tunnel from the thoracic incision to the gluteal incision, and the lead terminals were passed through the tunneling sheath to the gluteal

## 2023-06-06 NOTE — ANESTHESIA PROCEDURE NOTES
Airway  Date/Time: 6/6/2023 1:30 PM  Urgency: elective    Airway not difficult    General Information and Staff    Patient location during procedure: OR  Resident/CRNA: VALARIE Bower - CRNA  Performed: resident/CRNA     Indications and Patient Condition  Indications for airway management: anesthesia  Spontaneous ventilation: present  Sedation level: deep  Preoxygenated: yes  Patient position: sniffing  Mask difficulty assessment: vent by bag mask + OA or adjuvant +/- NMBA    Final Airway Details  Final airway type: endotracheal airway      Successful airway: ETT  Cuffed: yes   Successful intubation technique: direct laryngoscopy  Endotracheal tube insertion site: oral  Blade: Braga  Blade size: #4  ETT size (mm): 7.5  Cormack-Lehane Classification: grade I - full view of glottis  Placement verified by: chest auscultation and capnometry   Inital cuff pressure (cm H2O): 7  Measured from: teeth  ETT to teeth (cm): 23  Number of attempts at approach: 1  Ventilation between attempts: 2 hand mask    Additional Comments  DL x 2 with MAC 4 blade, grade 1 view, 7.5 mm ETT passed easily through the glottic opening atraumatically. + BBS, +ETCO2, equal rise and fall of chest, - epigastric sounds. Dentition unchanged.    no

## 2023-06-06 NOTE — DISCHARGE INSTRUCTIONS
every day. Exercise    Do back exercises as instructed by your doctor. Your doctor may advise you to work with a physical therapist to improve the strength and flexibility of your back. Other instructions    To reduce stiffness and help sore muscles, use a warm water bottle, a heating pad set on low, or a warm cloth on your back. Do not put heat right over the incision. Do not go to sleep with a heating pad on your skin. Follow-up care is a key part of your treatment and safety. Be sure to make and go to all appointments, and call your doctor if you are having problems. It's also a good idea to know your test results and keep a list of the medicines you take. When should you call for help? Call 911 anytime you think you may need emergency care. For example, call if:    You passed out (lost consciousness). You have sudden chest pain and shortness of breath, or you cough up blood. You are unable to move a leg at all. Call your doctor now or seek immediate medical care if:    You have new or worse symptoms in your legs or buttocks. Symptoms may include:  Numbness or tingling. Weakness. Pain. You lose bladder or bowel control. You have loose stitches, or your incision comes open. You have blood or fluid draining from the incision. You have signs of infection, such as: Increased pain, swelling, warmth, or redness. Pus draining from the incision. A fever. Red streaks leading from the incision. Watch closely for changes in your health, and be sure to contact your doctor if:    You do not have a bowel movement after taking a laxative. You are not getting better as expected. Where can you learn more? Go to http://www.woods.com/ and enter U123 to learn more about \"Lumbar Laminectomy: What to Expect at Home. \"  Current as of: November 9, 2022               Content Version: 13.6  © 6788-4700 [employer].    Care instructions adapted under license by

## 2023-06-06 NOTE — ANESTHESIA PRE PROCEDURE
Take 1 tablet by mouth daily    Historical Provider, MD       Current medications:    No current facility-administered medications for this visit. No current outpatient medications on file. Facility-Administered Medications Ordered in Other Visits   Medication Dose Route Frequency Provider Last Rate Last Admin    lactated ringers IV soln infusion   IntraVENous Continuous [place] Opitz,  mL/hr at 06/06/23 0934 New Bag at 06/06/23 0934       Allergies:     Allergies   Allergen Reactions    Ciprofloxacin Swelling       Problem List:    Patient Active Problem List   Diagnosis Code    DDD (degenerative disc disease), lumbar M51.36    Spondylolisthesis at L4-L5 level M43.16    Chronic bilateral low back pain without sciatica M54.50, G89.29       Past Medical History:        Diagnosis Date    Back pain     CAD (coronary artery disease) 10/2014    STENT X 1; sees dr. Rubén Medrano DDD (degenerative disc disease), lumbar 03/13/2018    Enlarged prostate     Failed back syndrome of thoracic spine     Hyperlipidemia     Hypertension     Obstructive sleep apnea treated with BiPAP     Pre-diabetes     mild    Spondylolisthesis at L4-L5 level 03/13/2018       Past Surgical History:        Procedure Laterality Date    ANKLE FUSION Right 2011   [address] 41  2014    angioplasty with stents    HERNIA REPAIR      JOINT REPLACEMENT Left 2016    knee    [place] ARTHRD ANT INTERBODY  [facility] LUMBAR Left 3/13/2018    L4-5 LLIF performed by Kassandra Whitehead MD at [address] [facility]TR[facility] LUMBAR N/A 3/13/2018    POSTEIROR SPINAL FUSION WITH INSTRUMENTATION performed by Kassandra Whitehead MD at [address] [facility] [place] DX/THER SBST EPIDURAL/SUBARACH LUMBAR/SACRAL N/A 6/6/2017    LUMBAR INTER LAMINAR SELWYN L4-5 #1 performed by Damon Terry at [address] DX/THER SBST EPIDURAL/SUBARACH LUMBAR/SACRAL N/A 9/5/2017    L4-5 EPIDURAL STEROID INJECTION performed by Neema Junior at [address]
Positive

## 2023-06-06 NOTE — ANESTHESIA POSTPROCEDURE EVALUATION
Department of Anesthesiology  Postprocedure Note    Patient: Lonnie Owens  MRN: [id number]  YOB: 1955  Date of evaluation: 6/6/2023      Procedure Summary     Date: 06/06/23 Room / Location: [address]    Anesthesia Start: 1317 Anesthesia Stop:     Procedure: THORACIC LAMINECTOMY FOR SPINAL CORD STIMULATOR INSERTION WITH NEUROMONITORING - [employer] Diagnosis:       Failed back syndrome      (Failed back syndrome [M96.1])    Surgeons: Good Torres MD Responsible Provider: VALARIE Mcelroy - CRNA    Anesthesia Type: general ASA Status: 3          Anesthesia Type: No value filed.     Joe Phase I: Joe Score: 10    Joe Phase II:        Anesthesia Post Evaluation    Patient location during evaluation: PACU  Patient participation: complete - patient participated  Level of consciousness: responsive to verbal stimuli  Airway patency: patent  Nausea & Vomiting: no nausea and no vomiting  Complications: no  Cardiovascular status: blood pressure returned to baseline and hemodynamically stable  Respiratory status: acceptable, face mask and nonlabored ventilation  Hydration status: stable  Multimodal analgesia pain management approach

## 2023-06-07 NOTE — PROGRESS NOTES
CLINICAL PHARMACY NOTE: MEDS TO BEDS    Total # of Prescriptions Filled: 1   The following medications were delivered to the patient:  Discharge Medication List as of 6/6/2023  5:37 PM        START taking these medications    Details   oxyCODONE-acetaminophen (PERCOCET) 5-325 MG per tablet Take 1-2 tablets by mouth every 4 hours as needed for Pain for up to 3 days. Intended supply: 3 days. Take lowest dose possible to manage pain Max Daily Amount: 12 tablets, Disp-36 tablet, R-0Normal               Additional Documentation:    Delivered RX's to patient bedside. Also dispensed free Narcan. Paid with cash.

## 2023-06-20 ENCOUNTER — OFFICE VISIT (OUTPATIENT)
Dept: NEUROSURGERY | Age: 68
End: 2023-06-20

## 2023-06-20 VITALS
RESPIRATION RATE: 18 BRPM | HEIGHT: 68 IN | WEIGHT: 255 LBS | HEART RATE: 80 BPM | DIASTOLIC BLOOD PRESSURE: 82 MMHG | SYSTOLIC BLOOD PRESSURE: 132 MMHG | BODY MASS INDEX: 38.65 KG/M2

## 2023-06-20 DIAGNOSIS — Z96.89 S/P INSERTION OF SPINAL CORD STIMULATOR: Primary | ICD-10-CM

## 2023-06-20 DIAGNOSIS — M96.1 FAILED BACK SYNDROME: ICD-10-CM

## 2023-06-20 DIAGNOSIS — M48.062 SPINAL STENOSIS OF LUMBAR REGION WITH NEUROGENIC CLAUDICATION: ICD-10-CM

## 2023-06-20 PROCEDURE — 99024 POSTOP FOLLOW-UP VISIT: CPT | Performed by: NEUROLOGICAL SURGERY

## 2023-06-20 ASSESSMENT — ENCOUNTER SYMPTOMS
EYES NEGATIVE: 1
RESPIRATORY NEGATIVE: 1
BACK PAIN: 1
GASTROINTESTINAL NEGATIVE: 1

## 2023-06-20 NOTE — PROGRESS NOTES
Review of Systems   Constitutional: Negative. HENT: Negative. Eyes: Negative. Respiratory: Negative. Cardiovascular: Negative. Gastrointestinal: Negative. Genitourinary: Negative. Musculoskeletal:  Positive for back pain, joint pain and myalgias. Skin: Negative. Neurological: Negative. Endo/Heme/Allergies: Negative. Psychiatric/Behavioral: Negative.
bilaterally  Somatosensory: normal light touch sensation          Assessment and Plan:    69 y.o. M returns for his first follow-up visit after implantation of a [employer]tronic spinal cord stimulator on 6/6/2022. He is doing well after surgery. His incisions are healing properly and his postoperative pain is improving as-expected. I will plan to see him again in one month for another wound check and to assess his satisfaction with his stimulator coverage.       Electronically signed by Meagan Lyon MD on 6/20/2023 at 10:28 AM

## 2023-07-19 ENCOUNTER — OFFICE VISIT (OUTPATIENT)
Dept: NEUROSURGERY | Age: 68
End: 2023-07-19

## 2023-07-19 VITALS
WEIGHT: 255 LBS | RESPIRATION RATE: 18 BRPM | HEART RATE: 51 BPM | DIASTOLIC BLOOD PRESSURE: 72 MMHG | BODY MASS INDEX: 38.65 KG/M2 | SYSTOLIC BLOOD PRESSURE: 116 MMHG | HEIGHT: 68 IN | OXYGEN SATURATION: 94 %

## 2023-07-19 DIAGNOSIS — Z96.89 S/P INSERTION OF SPINAL CORD STIMULATOR: Primary | ICD-10-CM

## 2023-07-19 DIAGNOSIS — M96.1 FAILED BACK SYNDROME: ICD-10-CM

## 2023-07-19 PROCEDURE — 99024 POSTOP FOLLOW-UP VISIT: CPT | Performed by: NEUROLOGICAL SURGERY

## 2023-07-19 ASSESSMENT — ENCOUNTER SYMPTOMS
GASTROINTESTINAL NEGATIVE: 1
RESPIRATORY NEGATIVE: 1
EYES NEGATIVE: 1

## 2023-07-19 NOTE — PROGRESS NOTES
Review of Systems   Constitutional: Negative. HENT: Negative. Eyes: Negative. Respiratory: Negative. Cardiovascular: Negative. Gastrointestinal: Negative. Genitourinary: Negative. Musculoskeletal:  Positive for joint pain and myalgias. Skin: Negative. Endo/Heme/Allergies: Negative. Psychiatric/Behavioral: Negative.

## 2023-08-21 ENCOUNTER — OFFICE VISIT (OUTPATIENT)
Dept: NEUROSURGERY | Age: 68
End: 2023-08-21

## 2023-08-21 VITALS
WEIGHT: 255 LBS | HEIGHT: 68 IN | SYSTOLIC BLOOD PRESSURE: 118 MMHG | RESPIRATION RATE: 18 BRPM | DIASTOLIC BLOOD PRESSURE: 80 MMHG | BODY MASS INDEX: 38.65 KG/M2 | HEART RATE: 77 BPM

## 2023-08-21 DIAGNOSIS — Z96.89 S/P INSERTION OF SPINAL CORD STIMULATOR: Primary | ICD-10-CM

## 2023-08-21 DIAGNOSIS — M96.1 FAILED BACK SYNDROME: ICD-10-CM

## 2023-08-21 PROCEDURE — 99024 POSTOP FOLLOW-UP VISIT: CPT | Performed by: NEUROLOGICAL SURGERY

## 2023-08-21 ASSESSMENT — ENCOUNTER SYMPTOMS
RESPIRATORY NEGATIVE: 1
EYES NEGATIVE: 1
GASTROINTESTINAL NEGATIVE: 1
BACK PAIN: 1

## 2023-08-25 PROBLEM — M96.1 FAILED BACK SYNDROME: Status: ACTIVE | Noted: 2023-08-25

## 2024-02-21 ENCOUNTER — OFFICE VISIT (OUTPATIENT)
Dept: NEUROSURGERY | Age: 69
End: 2024-02-21
Payer: MEDICARE

## 2024-02-21 VITALS
HEART RATE: 77 BPM | HEIGHT: 68 IN | DIASTOLIC BLOOD PRESSURE: 72 MMHG | WEIGHT: 255.07 LBS | SYSTOLIC BLOOD PRESSURE: 138 MMHG | BODY MASS INDEX: 38.66 KG/M2

## 2024-02-21 DIAGNOSIS — Z96.89 S/P INSERTION OF SPINAL CORD STIMULATOR: Primary | ICD-10-CM

## 2024-02-21 PROCEDURE — 1123F ACP DISCUSS/DSCN MKR DOCD: CPT | Performed by: NEUROLOGICAL SURGERY

## 2024-02-21 PROCEDURE — 99213 OFFICE O/P EST LOW 20 MIN: CPT | Performed by: NEUROLOGICAL SURGERY

## 2024-02-21 ASSESSMENT — ENCOUNTER SYMPTOMS
RESPIRATORY NEGATIVE: 1
GASTROINTESTINAL NEGATIVE: 1
BACK PAIN: 1
EYES NEGATIVE: 1

## 2024-02-21 NOTE — PROGRESS NOTES
NEUROSURGERY POSTOPERATIVE FOLLOW UP NOTE      Chief Complaint:   Chief Complaint   Patient presents with    Follow-up     Patient is here for 6 month follow up. He states that his SCS is not programmed to his liking. He states that he will contact Arben with [employer]. No issues with incision area but he does sleep in a recliner due to not being to lay flat in bed.          Date of Surgery:  6/6/2023    Procedure Performed: Thoracic laminectomy for implantation of spinal cord stimulator ([employer]tronic)      Interval Update:    Planned follow-up visit after surgery.  He states that he is not noticing much benefit from his stimulator.  He denies any significant pain, but he continues to have difficulty with his balance, particularly walking on uneven surfaces.      HPI:     The patient is a 67 y.o. male who presents as a referral from Dr. Cardona's team in pain management to discuss implantation of a spinal cord stimulator.  He has a longstanding history of back problem with previous L4/5 XLIF and instrumentation performed by Dr. Espitia in 2018.  He also has a history of scoliosis and has seen Dr. Griffin and he advised against corrective surgery due to the surgical morbidity involved.  He recently underwent a trial of spinal cord stimulation using a Medtronic system and he reports this did provide ~70% relief of his back and leg pain.  He does complain of weakness in his left leg that is equally concerning to him as his back pain.  He reports difficulty walking and that he has started using a walker for assistance.  He has a history of prior right ankle fusion and states this gives him significant difficulty with his gait.       ROS    Constitutional: Negative.    HENT: Negative.     Eyes: Negative.    Respiratory: Negative.     Cardiovascular: Negative.    Gastrointestinal: Negative.    Genitourinary: Negative.    Musculoskeletal:  Positive for back pain, joint pain and myalgias.   Skin: Negative.    Neurological:  Positive

## 2024-02-21 NOTE — PROGRESS NOTES
Review of Systems   Constitutional: Negative.    HENT: Negative.     Eyes: Negative.    Respiratory: Negative.     Cardiovascular: Negative.    Gastrointestinal: Negative.    Genitourinary: Negative.    Musculoskeletal:  Positive for back pain, joint pain and myalgias.   Skin: Negative.    Neurological:  Positive for weakness (LLE).   Endo/Heme/Allergies: Negative.    Psychiatric/Behavioral: Negative.

## 2024-03-22 ENCOUNTER — OFFICE VISIT (OUTPATIENT)
Dept: CARDIOLOGY | Facility: CLINIC | Age: 69
End: 2024-03-22
Payer: MEDICARE

## 2024-03-22 VITALS
BODY MASS INDEX: 41.22 KG/M2 | HEIGHT: 68 IN | SYSTOLIC BLOOD PRESSURE: 132 MMHG | DIASTOLIC BLOOD PRESSURE: 74 MMHG | HEART RATE: 50 BPM | OXYGEN SATURATION: 94 % | WEIGHT: 272 LBS

## 2024-03-22 DIAGNOSIS — E78.2 MIXED HYPERLIPIDEMIA: ICD-10-CM

## 2024-03-22 DIAGNOSIS — I25.10 CORONARY ARTERY DISEASE INVOLVING NATIVE CORONARY ARTERY OF NATIVE HEART WITHOUT ANGINA PECTORIS: Primary | ICD-10-CM

## 2024-03-22 DIAGNOSIS — E66.01 CLASS 3 SEVERE OBESITY DUE TO EXCESS CALORIES WITH SERIOUS COMORBIDITY AND BODY MASS INDEX (BMI) OF 40.0 TO 44.9 IN ADULT: ICD-10-CM

## 2024-03-22 DIAGNOSIS — I10 ESSENTIAL HYPERTENSION: ICD-10-CM

## 2024-03-22 PROCEDURE — 3078F DIAST BP <80 MM HG: CPT | Performed by: NURSE PRACTITIONER

## 2024-03-22 PROCEDURE — 99214 OFFICE O/P EST MOD 30 MIN: CPT | Performed by: NURSE PRACTITIONER

## 2024-03-22 PROCEDURE — 93000 ELECTROCARDIOGRAM COMPLETE: CPT | Performed by: NURSE PRACTITIONER

## 2024-03-22 PROCEDURE — 1160F RVW MEDS BY RX/DR IN RCRD: CPT | Performed by: NURSE PRACTITIONER

## 2024-03-22 PROCEDURE — 3075F SYST BP GE 130 - 139MM HG: CPT | Performed by: NURSE PRACTITIONER

## 2024-03-22 PROCEDURE — 1159F MED LIST DOCD IN RCRD: CPT | Performed by: NURSE PRACTITIONER

## 2024-03-22 RX ORDER — TRIAMCINOLONE ACETONIDE 1 MG/G
CREAM TOPICAL
COMMUNITY

## 2024-03-22 NOTE — PROGRESS NOTES
"    Subjective:     Encounter Date:03/22/2024      Patient ID: Caron Newman is a 68 y.o. male.    Chief Complaint:\"no complaints\"  Coronary Artery Disease  Presents for follow-up visit. Symptoms include leg swelling (right ankle). Pertinent negatives include no chest pain, dizziness, palpitations, shortness of breath or weight gain. Risk factors include hyperlipidemia. The symptoms have been stable.   Hyperlipidemia  This is a chronic problem. The problem is controlled. Recent lipid tests were reviewed and are low. Pertinent negatives include no chest pain or shortness of breath.   Hypertension  This is a chronic problem. The current episode started more than 1 year ago. The problem has been stable since onset. The problem is controlled. Pertinent negatives include no chest pain, malaise/fatigue, orthopnea, palpitations, PND or shortness of breath.     Patient presents today as a routine follow up. He has been followed by Dr. Helton for management of CAD s/p thrombectomy with PRISCILLA to the proximal RCA in 2014 following a STEMI. He had a high risk stress test in 2019 with a subsequent C that revealed a  of the RPL branch.     He reports he has been well. His only problems are bendopnea which is chronic and unchanged as well as poor mobility and stumbling from time to time. He reports he had annual labs with his PCP Tuesday of this week as well as his annual follow up and got a good report. He has chronic right ankle edema from a previous surgery. He denies chest pain, palpitations and dyspnea.      The following portions of the patient's history were reviewed and updated as appropriate: allergies, current medications, past family history, past medical history, past social history, past surgical history and problem list.    Allergies   Allergen Reactions    Ciprofloxacin Swelling       Current Outpatient Medications:     acetaminophen-codeine (TYLENOL #3) 300-30 MG per tablet, As Needed., Disp: , Rfl:     " amLODIPine (NORVASC) 5 MG tablet, Take 1 tablet by mouth Daily., Disp: , Rfl:     aspirin 81 MG EC tablet, Take 1 tablet by mouth Daily., Disp: , Rfl:     atorvastatin (LIPITOR) 20 MG tablet, TAKE 1 TABLET EVERY NIGHT AT BEDTIME, Disp: 90 tablet, Rfl: 3    carvedilol (COREG) 25 MG tablet, Take 1 tablet by mouth 2 (Two) Times a Day With Meals., Disp: , Rfl:     diclofenac-miSOPROStol (ARTHROTEC 75) 75-0.2 MG EC tablet, Take 1 tablet by mouth 2 (Two) Times a Day., Disp: , Rfl:     diphenhydrAMINE-acetaminophen (TYLENOL PM)  MG tablet per tablet, Take 2 tablets by mouth At Night As Needed for Sleep., Disp: , Rfl:     furosemide (LASIX) 20 MG tablet, TAKE 1 TABLET DAILY, Disp: 90 tablet, Rfl: 3    lisinopril-hydrochlorothiazide (PRINZIDE,ZESTORETIC) 20-25 MG per tablet, Take 1 tablet by mouth Daily., Disp: , Rfl:     magnesium gluconate (MAGONATE) 500 MG tablet, Take 500 mg by mouth 2 (Two) Times a Day., Disp: , Rfl:     metFORMIN (GLUCOPHAGE) 500 MG tablet, Daily., Disp: , Rfl:     Multiple Vitamin (MULTI-VITAMIN DAILY PO), Take  by mouth., Disp: , Rfl:     Omega-3 Fatty Acids (FISH OIL) 1000 MG capsule capsule, Take  by mouth 2 (Two) Times a Day With Meals., Disp: , Rfl:     potassium chloride (K-DUR,KLOR-CON) 10 MEQ CR tablet, TAKE 1 TABLET DAILY, Disp: 90 tablet, Rfl: 3    tamsulosin (FLOMAX) 0.4 MG capsule 24 hr capsule, Take 1 capsule by mouth Every Night., Disp: 90 capsule, Rfl: 3    traZODone (DESYREL) 50 MG tablet, every night at bedtime., Disp: , Rfl:     triamcinolone (KENALOG) 0.1 % cream, , Disp: , Rfl:   Past Medical History:   Diagnosis Date    CAD (coronary artery disease)     Cancer     skin    Hyperchloremia     Hyperlipidemia     Hypertension     Left leg pain     Low back pain     Myocardial infarction     Peripheral vascular disease     Retention, urine     S/P coronary artery stent placement     right side    Sleep apnea        Social History     Socioeconomic History    Marital status:     Tobacco Use    Smoking status: Never     Passive exposure: Never    Smokeless tobacco: Never   Vaping Use    Vaping status: Never Used   Substance and Sexual Activity    Alcohol use: Never    Drug use: Never    Sexual activity: Defer       Review of Systems   Constitutional: Negative for malaise/fatigue, weight gain and weight loss.   Cardiovascular:  Positive for leg swelling (right ankle). Negative for chest pain, dyspnea on exertion, irregular heartbeat, near-syncope, orthopnea, palpitations, paroxysmal nocturnal dyspnea and syncope.   Respiratory:  Negative for cough, shortness of breath, sleep disturbances due to breathing, sputum production and wheezing.         Bendopnea   Skin:  Negative for dry skin, flushing, itching and rash.   Gastrointestinal:  Negative for hematemesis and hematochezia.   Neurological:  Negative for dizziness, light-headedness, loss of balance and weakness.   All other systems reviewed and are negative.         Objective:     Vitals reviewed.   Constitutional:       General: Not in acute distress.     Appearance: Healthy appearance. Well-developed. Obese. Not diaphoretic.   Eyes:      General: No scleral icterus.     Conjunctiva/sclera: Conjunctivae normal.      Pupils: Pupils are equal, round, and reactive to light.   HENT:      Head: Normocephalic.    Mouth/Throat:      Pharynx: No oropharyngeal exudate.   Neck:      Vascular: No JVR.   Pulmonary:      Effort: Pulmonary effort is normal. No respiratory distress.      Breath sounds: Normal breath sounds. No wheezing. No rhonchi. No rales.   Chest:      Chest wall: Not tender to palpatation.   Cardiovascular:      Normal rate. Regular rhythm.      Murmurs: There is no murmur.   Pulses:     Intact distal pulses.   Edema:     Peripheral edema present.     Ankle: 1+ non-pitting edema of the right ankle.     Feet: 1+ non-pitting edema of the right foot.  Abdominal:      General: Bowel sounds are normal. There is no distension.      " Palpations: Abdomen is soft.      Tenderness: There is no abdominal tenderness.   Musculoskeletal: Normal range of motion.      Cervical back: Normal range of motion and neck supple. Skin:     General: Skin is warm and dry.      Coloration: Skin is not pale.      Findings: No erythema or rash.   Neurological:      Mental Status: Alert, oriented to person, place, and time and oriented to person, place and time.      Deep Tendon Reflexes: Reflexes are normal and symmetric.   Psychiatric:         Behavior: Behavior normal.             ECG 12 Lead    Date/Time: 3/22/2024 10:00 AM  Performed by: Rosalie Luna APRN    Authorized by: Rosalie Luna APRN  Comparison: compared with previous ECG from 3/17/2023  Similar to previous ECG  Rhythm: sinus bradycardia  Rate: bradycardic  BPM: 50  Conduction: 1st degree AV block  Q waves: III and V1    ST Segments: ST segments normal  T inversion: III, V1 and aVF  QRS axis: normal  Other findings: T wave abnormality    Clinical impression: abnormal EKG        /74 (BP Location: Left arm, Patient Position: Sitting, Cuff Size: Adult)   Pulse 50   Ht 172.7 cm (68\")   Wt 123 kg (272 lb)   SpO2 94%   BMI 41.36 kg/m²     Lab Review:   I have reviewed previous office notes, recent labs and recent cardiac testing.     Lipid 3/2023:      Cath 3/2019:  Conclusion:  Chronic total occlusion of the distal RPDA  Normal LV function    Cath 2014:  CONCLUSION:  1.  Successful stent placement to the proximal right coronary artery.  2.  Normal left ventricular function.      Assessment:          Diagnosis Plan   1. Coronary artery disease involving native coronary artery of native heart without angina pectoris        2. Essential hypertension        3. Mixed hyperlipidemia        4. Class 3 severe obesity due to excess calories with serious comorbidity and body mass index (BMI) of 40.0 to 44.9 in adult               Plan:       1 CAD- stable. s/p PRISCILLA to the RCA in 2014. No clinical " signs of ongoing ischemia. Continue ASA, ACEi, BB and statin. Educated on risk of GI bleeding with use of NSAIDs and ASA.   2. HTN- controlled today. Continue current therapy.   3. HLD- last lipid on file from 3/2023 and controlled at 65. Will obtain lab results that were drawn on Tuesday. Continue statin.   4. BMI- Class 3 Severe Obesity (BMI >=40). Obesity-related health conditions include the following: hypertension, coronary heart disease, and dyslipidemias. Obesity is unchanged. BMI is is above average; BMI management plan is completed. We discussed portion control and increasing exercise.        Follow up in 1 year with myself or sooner if symptoms worsen.     I spent 30 minutes caring for Rexal on this date of service. This time includes time spent by me in the following activities:preparing for the visit, reviewing tests, obtaining and/or reviewing a separately obtained history, performing a medically appropriate examination and/or evaluation , documenting information in the medical record, and care coordination    I spent 2 minutes on the separately reported service of EKg interpretation. This time is not included in the time used to support the E/M service also reported today.    Advance Care Planning   ACP discussion was held with the patient during this visit. Patient does not have an advance directive, information provided.

## 2025-02-06 ENCOUNTER — OFFICE VISIT (OUTPATIENT)
Dept: VASCULAR SURGERY | Age: 70
End: 2025-02-06
Payer: MEDICARE

## 2025-02-06 VITALS
HEART RATE: 69 BPM | SYSTOLIC BLOOD PRESSURE: 124 MMHG | TEMPERATURE: 98 F | DIASTOLIC BLOOD PRESSURE: 80 MMHG | OXYGEN SATURATION: 93 %

## 2025-02-06 DIAGNOSIS — M79.605 LEG PAIN, LEFT: ICD-10-CM

## 2025-02-06 DIAGNOSIS — M79.89 LEG SWELLING: ICD-10-CM

## 2025-02-06 DIAGNOSIS — I73.9 CLAUDICATION (HCC): Primary | ICD-10-CM

## 2025-02-06 DIAGNOSIS — M79.604 LEG PAIN, RIGHT: ICD-10-CM

## 2025-02-06 DIAGNOSIS — I87.2 VENOUS INSUFFICIENCY: ICD-10-CM

## 2025-02-06 PROCEDURE — 1123F ACP DISCUSS/DSCN MKR DOCD: CPT | Performed by: NURSE PRACTITIONER

## 2025-02-06 PROCEDURE — 1159F MED LIST DOCD IN RCRD: CPT | Performed by: NURSE PRACTITIONER

## 2025-02-06 PROCEDURE — 99203 OFFICE O/P NEW LOW 30 MIN: CPT | Performed by: NURSE PRACTITIONER

## 2025-02-06 RX ORDER — TRAMADOL HYDROCHLORIDE 50 MG/1
50 TABLET ORAL EVERY 6 HOURS PRN
COMMUNITY

## 2025-02-06 NOTE — PROGRESS NOTES
Leatha Carpenter (:  1955) is a 69 y.o. male,Established patient, here for evaluation of the following chief complaint(s):  Establish Care (Wounds on his ankles)            SUBJECTIVE/OBJECTIVE:  Leatha has a history of wound of ankle.  Had fusion years ago.  Then hardware removed.  Then wound reopened in Sept.  Going to [facility].   He has had this for 1 - 5 years. Current treatment includes asa and lipitor.  Leatha has not had new wounds. Recently, he reports claudication at a distance of  which varies.  He has leg swimming.  Leatha reports that the right leg is equal to the left.  He reports claudication is not changed and is mostly in the form of crampy type pain starting in the calves. He has a short recovery time. This is reproducible in nature. He reports ischemic rest pain 0 times per night.  He reports walking with cart does not help.    I have personally reviewed the following: problem list, current meds, allergies, PMH, PSH, family hx, and social hx  Leatha Carpenter is a 69 y.o. male with the following history as recorded in [facility]:  Patient Active Problem List    Diagnosis Date Noted    Failed back syndrome 2023    DDD (degenerative disc disease), lumbar 2018    Spondylolisthesis at L4-L5 level 2018    Chronic bilateral low back pain without sciatica 2018     Current Outpatient Medications   Medication Sig Dispense Refill    traMADol (ULTRAM) 50 MG tablet Take 1 tablet by mouth every 6 hours as needed for Pain. Max Daily Amount: 200 mg      aspirin 81 MG tablet Take 1 tablet by mouth daily 1 tablet 0    diphenhydrAMINE-APAP, sleep, (TYLENOL PM EXTRA STRENGTH)  MG tablet Take 2 tablets by mouth at bedtime      Diclofenac Sodium 100 MG TB24 Take 100 mg by mouth 2 times daily      lisinopril-hydrochlorothiazide (PRINZIDE;ZESTORETIC) 20-25 MG per tablet Take 1 tablet by mouth daily      furosemide (LASIX) 20 MG tablet Take 1 tablet by mouth daily      magnesium gluconate

## 2025-02-24 ENCOUNTER — TELEPHONE (OUTPATIENT)
Dept: VASCULAR SURGERY | Age: 70
End: 2025-02-24

## 2025-02-24 NOTE — TELEPHONE ENCOUNTER
Left a message for the patient on his home phone with the following information.  I left the appointment date of the venous reflux study.  This is on a Friday as this is the only date the vascular lab had so we will have to make the patient an appointment on a different date to follow up with Erika.  I am mailing the patient the appointment card and left my phone number should the patient have questions.               ----- Message from VALARIE Hair sent at 2/24/2025  7:25 AM CST -----  Please let him know [place] [facility] [place].  He needs a reflux study.  First available please and visit to follow same day

## 2025-03-28 ENCOUNTER — OFFICE VISIT (OUTPATIENT)
Dept: CARDIOLOGY | Facility: CLINIC | Age: 70
End: 2025-03-28
Payer: MEDICARE

## 2025-03-28 VITALS
HEART RATE: 53 BPM | WEIGHT: 270 LBS | SYSTOLIC BLOOD PRESSURE: 142 MMHG | OXYGEN SATURATION: 94 % | DIASTOLIC BLOOD PRESSURE: 80 MMHG | HEIGHT: 68 IN | BODY MASS INDEX: 40.92 KG/M2

## 2025-03-28 DIAGNOSIS — E66.813 CLASS 3 SEVERE OBESITY DUE TO EXCESS CALORIES WITH SERIOUS COMORBIDITY AND BODY MASS INDEX (BMI) OF 40.0 TO 44.9 IN ADULT: ICD-10-CM

## 2025-03-28 DIAGNOSIS — I25.10 CORONARY ARTERY DISEASE INVOLVING NATIVE CORONARY ARTERY OF NATIVE HEART WITHOUT ANGINA PECTORIS: Primary | ICD-10-CM

## 2025-03-28 DIAGNOSIS — E66.01 CLASS 3 SEVERE OBESITY DUE TO EXCESS CALORIES WITH SERIOUS COMORBIDITY AND BODY MASS INDEX (BMI) OF 40.0 TO 44.9 IN ADULT: ICD-10-CM

## 2025-03-28 DIAGNOSIS — I10 ESSENTIAL HYPERTENSION: ICD-10-CM

## 2025-03-28 DIAGNOSIS — E78.2 MIXED HYPERLIPIDEMIA: ICD-10-CM

## 2025-03-28 RX ORDER — ATORVASTATIN CALCIUM 40 MG/1
40 TABLET, FILM COATED ORAL
Qty: 90 TABLET | Refills: 3 | Status: SHIPPED | OUTPATIENT
Start: 2025-03-28 | End: 2025-03-28

## 2025-03-28 RX ORDER — POTASSIUM CHLORIDE 750 MG/1
CAPSULE, EXTENDED RELEASE ORAL
COMMUNITY
Start: 2025-02-10 | End: 2025-03-28

## 2025-03-28 RX ORDER — ATORVASTATIN CALCIUM 40 MG/1
40 TABLET, FILM COATED ORAL
Qty: 90 TABLET | Refills: 3 | Status: SHIPPED | OUTPATIENT
Start: 2025-03-28

## 2025-03-28 RX ORDER — DOXYCYCLINE 100 MG/1
CAPSULE ORAL
COMMUNITY
Start: 2025-03-19

## 2025-03-28 NOTE — PROGRESS NOTES
"    Subjective:     Encounter Date:03/28/2025      Patient ID: Caron Newman is a 69 y.o. male.    Chief Complaint:\"no complaints\"  Coronary Artery Disease  Presents for follow-up visit. Symptoms include leg swelling (right ankle). Pertinent negatives include no chest pain, dizziness, palpitations, shortness of breath or weight gain. Risk factors include hyperlipidemia. The symptoms have been stable.   Hyperlipidemia  This is a chronic problem. The problem is controlled. Recent lipid tests were reviewed and are low. Pertinent negatives include no chest pain or shortness of breath.   Hypertension  This is a chronic problem. The current episode started more than 1 year ago. The problem has been stable since onset. The problem is controlled. Pertinent negatives include no chest pain, malaise/fatigue, orthopnea, palpitations, PND or shortness of breath.     Patient presents today as a routine follow up. He is followed for management of CAD s/p thrombectomy with PRISCILLA to the proximal RCA in 2014 following a STEMI. He had a high risk stress test in 2019 with subsequent C noting a  of the RPL branch.     He reports he has been well. He continues to report issues with bendopnea which is chronic and unchanged. He also reports he is falling more than he used to. He has a wound on his ankle from a surgery, 2 years ago, that recently started draining again therefore he is following with wound care. He notes his previous angina to be \"I thought it was indigestion\" but cannot recall the location or discomfort type of his pain. He does not monitor his BP at home however reports his BP is checked weekly at wound care. He denies chest pain and palpitations.       The following portions of the patient's history were reviewed and updated as appropriate: allergies, current medications, past family history, past medical history, past social history, past surgical history and problem list.    Allergies   Allergen Reactions    " Ciprofloxacin Swelling       Current Outpatient Medications:     acetaminophen-codeine (TYLENOL #3) 300-30 MG per tablet, As Needed., Disp: , Rfl:     amLODIPine (NORVASC) 5 MG tablet, Take 1 tablet by mouth Daily., Disp: , Rfl:     aspirin 81 MG EC tablet, Take 1 tablet by mouth Daily., Disp: , Rfl:     atorvastatin (LIPITOR) 40 MG tablet, Take 1 tablet by mouth every night at bedtime., Disp: 90 tablet, Rfl: 3    carvedilol (COREG) 25 MG tablet, Take 1 tablet by mouth 2 (Two) Times a Day With Meals., Disp: , Rfl:     diclofenac-miSOPROStol (ARTHROTEC 75) 75-0.2 MG EC tablet, Take 1 tablet by mouth 2 (Two) Times a Day., Disp: , Rfl:     diphenhydrAMINE-acetaminophen (TYLENOL PM)  MG tablet per tablet, Take 2 tablets by mouth At Night As Needed for Sleep., Disp: , Rfl:     doxycycline (VIBRAMYCIN) 100 MG capsule, , Disp: , Rfl:     furosemide (LASIX) 20 MG tablet, TAKE 1 TABLET DAILY, Disp: 90 tablet, Rfl: 3    lisinopril-hydrochlorothiazide (PRINZIDE,ZESTORETIC) 20-25 MG per tablet, Take 1 tablet by mouth Daily., Disp: , Rfl:     magnesium gluconate (MAGONATE) 500 MG tablet, Take 1 tablet by mouth 2 (Two) Times a Day., Disp: , Rfl:     metFORMIN (GLUCOPHAGE) 500 MG tablet, Daily., Disp: , Rfl:     Multiple Vitamin (MULTI-VITAMIN DAILY PO), Take  by mouth., Disp: , Rfl:     Omega-3 Fatty Acids (FISH OIL) 1000 MG capsule capsule, Take  by mouth 2 (Two) Times a Day With Meals., Disp: , Rfl:     potassium chloride (K-DUR,KLOR-CON) 10 MEQ CR tablet, TAKE 1 TABLET DAILY, Disp: 90 tablet, Rfl: 3    tamsulosin (FLOMAX) 0.4 MG capsule 24 hr capsule, Take 1 capsule by mouth Every Night., Disp: 90 capsule, Rfl: 3    traZODone (DESYREL) 50 MG tablet, every night at bedtime., Disp: , Rfl:     triamcinolone (KENALOG) 0.1 % cream, , Disp: , Rfl:   Past Medical History:   Diagnosis Date    CAD (coronary artery disease)     Cancer     skin    Hyperchloremia     Hyperlipidemia     Hypertension     Left leg pain     Low back  pain     Myocardial infarction     Peripheral vascular disease     Retention, urine     S/P coronary artery stent placement     right side    Sleep apnea        Social History     Socioeconomic History    Marital status:    Tobacco Use    Smoking status: Never     Passive exposure: Never    Smokeless tobacco: Never   Vaping Use    Vaping status: Never Used   Substance and Sexual Activity    Alcohol use: Never    Drug use: Never    Sexual activity: Defer       Review of Systems   Constitutional: Negative for malaise/fatigue, weight gain and weight loss.   Cardiovascular:  Positive for leg swelling (right ankle). Negative for chest pain, dyspnea on exertion, irregular heartbeat, near-syncope, orthopnea, palpitations, paroxysmal nocturnal dyspnea and syncope.   Respiratory:  Negative for cough, shortness of breath, sleep disturbances due to breathing, sputum production and wheezing.         Bendopnea   Skin:  Negative for dry skin, flushing, itching and rash.   Gastrointestinal:  Negative for hematemesis and hematochezia.   Neurological:  Negative for dizziness, light-headedness, loss of balance and weakness.   All other systems reviewed and are negative.         Objective:     Vitals reviewed.   Constitutional:       General: Not in acute distress.     Appearance: Healthy appearance. Well-developed. Morbidly obese. Not diaphoretic.   Eyes:      General: No scleral icterus.     Conjunctiva/sclera: Conjunctivae normal.      Pupils: Pupils are equal, round, and reactive to light.   HENT:      Head: Normocephalic.    Mouth/Throat:      Pharynx: No oropharyngeal exudate.   Neck:      Vascular: No JVR.   Pulmonary:      Effort: Pulmonary effort is normal. No respiratory distress.      Breath sounds: Normal breath sounds. No wheezing. No rhonchi. No rales.   Chest:      Chest wall: Not tender to palpatation.   Cardiovascular:      Normal rate. Regular rhythm.      Murmurs: There is no murmur.   Pulses:     Intact  "distal pulses.   Edema:     Peripheral edema present.     Ankle: 1+ non-pitting edema of the right ankle.     Feet: 1+ non-pitting edema of the right foot.  Abdominal:      General: Bowel sounds are normal. There is no distension.      Palpations: Abdomen is soft.      Tenderness: There is no abdominal tenderness.   Musculoskeletal: Normal range of motion.      Cervical back: Normal range of motion and neck supple. Skin:     General: Skin is warm and dry.      Coloration: Skin is not pale.      Findings: No erythema or rash.   Neurological:      Mental Status: Alert, oriented to person, place, and time and oriented to person, place and time.      Deep Tendon Reflexes: Reflexes are normal and symmetric.   Psychiatric:         Behavior: Behavior normal.             ECG 12 Lead    Date/Time: 3/28/2025 12:33 PM  Performed by: Rosalie Luna APRN    Authorized by: Rosalie Luna APRN  Comparison: compared with previous ECG from 3/22/2024  Similar to previous ECG  Comparison to previous ECst degree AV block now present  Rhythm: sinus bradycardia  Rate: bradycardic  BPM: 53  Conduction: 1st degree AV block  Q waves: V1, V2 and III    T inversion: III  T flattening: V1  QRS axis: normal  Other findings: T wave abnormality    Clinical impression: abnormal EKG        /80   Pulse 53   Ht 172.7 cm (68\")   Wt 122 kg (270 lb)   SpO2 94%   BMI 41.05 kg/m²     Lab Review:   I have reviewed previous office notes, recent labs and recent cardiac testing.     Lipid 3/23/2025:  Total: 133  Triglycerides: 126  HDL: 35  LDL 75    Cath 3/2019:  Conclusion:  Chronic total occlusion of the distal RPDA  Normal LV function    Cath 2014:  CONCLUSION:  1.  Successful stent placement to the proximal right coronary artery.  2.  Normal left ventricular function.      Assessment:          Diagnosis Plan   1. Coronary artery disease involving native coronary artery of native heart without angina pectoris        2. Essential " hypertension        3. Mixed hyperlipidemia        4. Class 3 severe obesity due to excess calories with serious comorbidity and body mass index (BMI) of 40.0 to 44.9 in adult               Plan:       1 CAD- stable. s/p PRISCILLA to the RCA in 2014. No clinical signs of ongoing ischemia. Continue ASA 81mg daily, lisinopril-hydrochlorothiazide 20-25mg daily, Coreg 25mg BID and lipitor at increased dose of 40mg daily.   2. HTN- was elevated on arrival and still on the higher end of normal. Recommend he monitor BP at home. Continue current therapy.   3. HLD- uncontrolled with LDL 75. Agreeable to increase lipitor from 20mg daily to 40mg daily.   4. BMI- Class 3 Severe Obesity (BMI >=40). Obesity-related health conditions include the following: hypertension, coronary heart disease, and dyslipidemias. Obesity is unchanged. BMI is is above average; BMI management plan is completed. We discussed portion control and increasing exercise.        Follow up in 1 year with myself or sooner if symptoms worsen.     I spent 30 minutes caring for Caron on this date of service. This time includes time spent by me in the following activities:preparing for the visit, reviewing tests, obtaining and/or reviewing a separately obtained history, performing a medically appropriate examination and/or evaluation , documenting information in the medical record, and care coordination    I spent 2 minutes on the separately reported service of EKg interpretation. This time is not included in the time used to support the E/M service also reported today.    Advance Care Planning   ACP discussion was held with the patient during this visit. Patient does not have an advance directive, information provided.

## 2025-04-04 ENCOUNTER — HOSPITAL ENCOUNTER (OUTPATIENT)
Dept: VASCULAR LAB | Age: 70
Discharge: HOME | End: 2025-04-04
Payer: MEDICARE

## 2025-04-04 DIAGNOSIS — M79.604 LEG PAIN, RIGHT: ICD-10-CM

## 2025-04-04 DIAGNOSIS — M79.605 LEG PAIN, LEFT: ICD-10-CM

## 2025-04-04 DIAGNOSIS — I87.2 VENOUS INSUFFICIENCY: ICD-10-CM

## 2025-04-04 DIAGNOSIS — M79.89 LEG SWELLING: ICD-10-CM

## 2025-04-04 PROCEDURE — 93970 EXTREMITY STUDY: CPT

## 2025-04-05 LAB
VAS LEFT CFV RFX: 1.4 S
VAS LEFT GSV JUNC DIAM: 10 MM
VAS LEFT GSV JUNC RFX: 2 S
VAS LEFT GSV THIGH DIST DIAM: 4.8 MM
VAS LEFT GSV THIGH DIST RFX: 0 S
VAS LEFT GSV THIGH MID DIAM: 5.1 MM
VAS LEFT GSV THIGH PROX DIAM: 5.3 MM
VAS LEFT GSV THIGH PROX RFX: 0 S
VAS LEFT GSV THIGHT MID RFX: 2 S
VAS LEFT SSV DIST DIAM: 3.4 MM
VAS LEFT SSV DIST RFX: 0 S
VAS LEFT SSV MID DIAM: 3.5 MM
VAS LEFT SSV MID RFX: 0 S
VAS LEFT SSV PROX DIAM: 3.4 MM
VAS LEFT SSV PROX RFX: 0 S
VAS RIGHT GSV AT KNEE DIAM: 0 MM
VAS RIGHT GSV JUNC DIAM: 10.9 MM
VAS RIGHT GSV JUNC RFX: 0 S
VAS RIGHT GSV THIGH DIST DIAM: 0 MM
VAS RIGHT GSV THIGH MID DIAM: 0 MM
VAS RIGHT GSV THIGH PROX DIAM: 3.1 MM
VAS RIGHT GSV THIGH PROX RFX: 2.2 S
VAS RIGHT SSV DIST DIAM: 3.4 MM
VAS RIGHT SSV DIST RFX: 1.3 S
VAS RIGHT SSV MID DIAM: 4.7 MM
VAS RIGHT SSV MID RFX: 0 S
VAS RIGHT SSV PROX DIAM: 3 MM
VAS RIGHT SSV PROX RFX: 0 S

## 2025-04-05 PROCEDURE — 93970 EXTREMITY STUDY: CPT | Performed by: SURGERY

## 2025-04-09 ENCOUNTER — TELEPHONE (OUTPATIENT)
Dept: VASCULAR SURGERY | Age: 70
End: 2025-04-09

## 2025-04-09 NOTE — TELEPHONE ENCOUNTER
I spoke with patient and he seems to think he had test done around 2/18. I Called [facility] records and they were having trouble finding report. She let me know she was going to look and call me back

## 2025-04-09 NOTE — TELEPHONE ENCOUNTER
----- Message from VALARIE Hair sent at 4/9/2025  8:28 AM CDT -----  Can we call and see if he had reflux study I ordered at [facility] and get a copy of it

## 2025-04-16 ENCOUNTER — OFFICE VISIT (OUTPATIENT)
Dept: VASCULAR SURGERY | Age: 70
End: 2025-04-16
Payer: MEDICARE

## 2025-04-16 VITALS
TEMPERATURE: 97.3 F | SYSTOLIC BLOOD PRESSURE: 106 MMHG | HEIGHT: 68 IN | OXYGEN SATURATION: 96 % | BODY MASS INDEX: 38.79 KG/M2 | DIASTOLIC BLOOD PRESSURE: 76 MMHG | HEART RATE: 53 BPM

## 2025-04-16 DIAGNOSIS — I87.2 VENOUS INSUFFICIENCY: Primary | ICD-10-CM

## 2025-04-16 PROCEDURE — 99213 OFFICE O/P EST LOW 20 MIN: CPT | Performed by: NURSE PRACTITIONER

## 2025-04-16 PROCEDURE — 1159F MED LIST DOCD IN RCRD: CPT | Performed by: NURSE PRACTITIONER

## 2025-04-16 PROCEDURE — 1123F ACP DISCUSS/DSCN MKR DOCD: CPT | Performed by: NURSE PRACTITIONER

## 2025-04-16 RX ORDER — CHLORAL HYDRATE 500 MG
CAPSULE ORAL 2 TIMES DAILY
COMMUNITY

## 2025-04-16 NOTE — PROGRESS NOTES
Leatha Carpenter (:  1955) is a 69 y.o. male,Established patient, here for evaluation of the following chief complaint(s):  Follow-up and Results            SUBJECTIVE/OBJECTIVE:  He has a known history of of chronic venous insufficiency. He reports prominent veins on the  Bilateral leg(s), lower extremity edema on the  Bilateral leg(s), the veins are painful -- severity:  moderate, pain is aggravated by upright posture, and leg ulcers present. He reports previous treatment includes prescription compression stockings for > 6 months  Previous ablation right GSV vein. He does not have a history of spontaneous rupture.  Has longstanding wound of right foot.  Lateral malleous.  Being treated by Dr. Gonzalez.  Per patient this is felt to be due to pressure and she is planning reconstruction of foot.    Differential diagnosis for leg pain includes but is not limited to: varicose veins, peripheral vascular disease, arthritic pain, DVT, lumbar disc disease, and neurogenic pain.      I have personally reviewed the following: problem list, current meds, allergies, PMH, PSH, family hx, and social hx  Leatha Carpenter is a 69 y.o. male with the following history as recorded in [facility]:  Patient Active Problem List    Diagnosis Date Noted    Failed back syndrome 2023    DDD (degenerative disc disease), lumbar 2018    Spondylolisthesis at L4-L5 level 2018    Chronic bilateral low back pain without sciatica 2018     Current Outpatient Medications   Medication Sig Dispense Refill    Omega-3 Fatty Acids (FISH OIL) 1000 MG capsule Take by mouth 2 times daily      aspirin 81 MG tablet Take 1 tablet by mouth daily 1 tablet 0    diphenhydrAMINE-APAP, sleep, (TYLENOL PM EXTRA STRENGTH)  MG tablet Take 2 tablets by mouth at bedtime      Diclofenac Sodium 100 MG TB24 Take 100 mg by mouth 2 times daily      lisinopril-hydrochlorothiazide (PRINZIDE;ZESTORETIC) 20-25 MG per tablet Take 1 tablet by mouth

## 2025-04-30 ENCOUNTER — TELEPHONE (OUTPATIENT)
Dept: CARDIOLOGY | Facility: CLINIC | Age: 70
End: 2025-04-30
Payer: MEDICARE

## 2025-04-30 RX ORDER — ROSUVASTATIN CALCIUM 20 MG/1
20 TABLET, COATED ORAL DAILY
Qty: 30 TABLET | Refills: 11 | Status: SHIPPED | OUTPATIENT
Start: 2025-04-30

## 2025-04-30 NOTE — TELEPHONE ENCOUNTER
----- Message from Rosalie Luna sent at 4/30/2025  3:10 PM CDT -----  Order placed  ----- Message -----  From: Didi Chu MA  Sent: 4/30/2025   2:50 PM CDT  To: TRISTEN Alonso    Patient notified, voiced understanding and agreement to proceed.  Please place order to pharmacy on file.  Akila Chu MA  ----- Message -----  From: Rosalie Luna APRN  Sent: 4/29/2025   3:21 PM CDT  To: Didi Chu MA    I would recommend we change him from lipitor to crestor and see if his myalgias improve.  ----- Message -----  From: Didi Chu MA  Sent: 4/28/2025   2:43 PM CDT  To: TRISTEN Alonso    PATIENT CALLED IN TO ASK IF HE CAN TITRATE DOWN TO HIS PREVIOUS DOSE OF ATORVASTATIN DUE TO MYALGIA.  PLEASE ADVISE.  Didi Chu MA

## 2025-08-04 ENCOUNTER — TELEPHONE (OUTPATIENT)
Dept: CARDIOLOGY | Facility: CLINIC | Age: 70
End: 2025-08-04
Payer: MEDICARE

## (undated) DEVICE — SUTURE VCRL SZ 1 L18IN ABSRB UD L36MM CT-1 1/2 CIR J841D

## (undated) DEVICE — ADHESIVE SKIN CLOSURE WND 8.661X1.5 IN 22 CM LIQUIBAND SECUR

## (undated) DEVICE — FORCEP BPLR IRIS

## (undated) DEVICE — BAND BAG 36" X 36": Brand: TIDI

## (undated) DEVICE — CANN CO2/O2 NASL A/

## (undated) DEVICE — DISSECTOR LAPSCP TIP DIA5MM BLNT W/ SECUR ATTACH KTNR

## (undated) DEVICE — C-ARMOR C-ARM EQUIPMENT COVERS CLEAR STERILE UNIVERSAL FIT 12 PER CASE: Brand: C-ARMOR

## (undated) DEVICE — KIT POS FOAM ARM CRADL BILAT CHST PD CVR HIP HNG CVR L

## (undated) DEVICE — TOWEL,OR,DSP,ST,BLUE,DLX,4/PK,20PK/CS: Brand: MEDLINE

## (undated) DEVICE — STRIP,CLOSURE,WOUND,MEDI-STRIP,1/2X4: Brand: MEDLINE

## (undated) DEVICE — SPK10281 JACKSON TABLE KIT: Brand: SPK10281 JACKSON TABLE KIT

## (undated) DEVICE — SYSTEM RECHARGING NEUROSTIMULATOR RECHRG BTTRY PK PWR SUPL

## (undated) DEVICE — CONTROLLER INTELLIS PTM NONTRAIL

## (undated) DEVICE — SUTURE VCRL SZ 2-0 L18IN ABSRB UD CT-1 L36MM 1/2 CIR J839D

## (undated) DEVICE — ENCORE® LATEX MICRO SIZE 6, STERILE LATEX POWDER-FREE SURGICAL GLOVE: Brand: ENCORE

## (undated) DEVICE — NEEDLE HYPO 18GA L1.5IN PNK POLYPR HUB S STL REG BVL STR

## (undated) DEVICE — UNDERGLOVE SURG SZ 8 FNGR THK0.21MIL GRN LTX BEAD CUF

## (undated) DEVICE — KIT MON ACCS ANNULOTOMY KNF WAVE GUID DISP TIMBERLINE

## (undated) DEVICE — PINNACLE INTRODUCER SHEATH: Brand: PINNACLE

## (undated) DEVICE — PASSER CATH LNG L55CM MAL STR UNI SHUNT KT DISP FOR

## (undated) DEVICE — SUTURE PERMA-HAND 0 L18IN NONABSORBABLE BLK MO-7 L22MM 1/2 C041D

## (undated) DEVICE — TOOL MR8-14MH30 MR8 14CM MATCH 3MM: Brand: MIDAS REX MR8

## (undated) DEVICE — PROBE PEDCL L165MM CANN W/ MOD JAMSH NDL NO2 MOD MOD

## (undated) DEVICE — SYSTEM SKIN CLSR 22CM DERMBND PRINEO

## (undated) DEVICE — SOLIDIFIER LIQUI LOC PLUS 2000CC

## (undated) DEVICE — SHARPS CONTAINER,HINGED LID, RED: Brand: SHARPSAFETY

## (undated) DEVICE — 3M™ STERI-STRIP™ REINFORCED ADHESIVE SKIN CLOSURES, R1548, 1 IN X 5 IN (25 MM X 125 MM), 4 STRIPS/ENVELOPE: Brand: 3M™ STERI-STRIP™

## (undated) DEVICE — TUBE ET 7.5MM NSL ORAL BASIC CUF INTMED MURPHY EYE RADPQ

## (undated) DEVICE — ENCORE® LATEX MICRO SIZE 6.5, STERILE LATEX POWDER-FREE SURGICAL GLOVE: Brand: ENCORE

## (undated) DEVICE — MODEL BT2000 P/N 700287-012KIT CONTENTS: MANIFOLD WITH SALINE AND CONTRAST PORTS, SALINE TUBING WITH SPIKE AND HAND SYRINGE, TRANSDUCER: Brand: BT2000 AUTOMATED MANIFOLD KIT

## (undated) DEVICE — SUTURE MCRYL SZ 4-0 L18IN ABSRB UD L19MM PS-2 3/8 CIR PRIM Y496G

## (undated) DEVICE — GOWN,PREVENTION PLUS,XLN/2XL,ST,22/CS: Brand: MEDLINE

## (undated) DEVICE — Device

## (undated) DEVICE — GW STARTER FXD CORE J .035 3X150CM 3MM

## (undated) DEVICE — GLOVE SURG SZ 75 L12IN FNGR THK94MIL TRNSLUC YEL LTX

## (undated) DEVICE — FORCEPS BPLR IRR INSUL BAYNT SMOOTH TIP STRL DISP L26.7CM SZ

## (undated) DEVICE — YANKAUER SUCTION INSTRUMENT WITHOUT CONTROL VENT, OPEN TIP, CLEAR: Brand: YANKAUER

## (undated) DEVICE — ELECTRD PAD DEFIB A/

## (undated) DEVICE — AGENT HEMSTAT 8ML FLX TIP MTRX + DISP SURGIFLO

## (undated) DEVICE — TIBURON DRAPE TOWELS: Brand: CONVERTORS

## (undated) DEVICE — HANDLE PRB CANN DETACH FOR GRP MOD MOD PEDIGUARD

## (undated) DEVICE — 1010 S-DRAPE TOWEL DRAPE 10/BX: Brand: STERI-DRAPE™

## (undated) DEVICE — CATH DIAG IMPULSE M/ PK 145 5FR

## (undated) DEVICE — NEURO CDS

## (undated) DEVICE — E-Z CLEAN, NON-STICK, PTFE COATED, ELECTROSURGICAL BLADE ELECTRODE, MODIFIED EXTENDED INSULATION, 2.5 INCH (6.35 CM): Brand: MEGADYNE

## (undated) DEVICE — ANGIO-SEAL VIP VASCULAR CLOSURE DEVICE: Brand: ANGIO-SEAL

## (undated) DEVICE — LARYNGOSCOPE BLDE MAC HNDL M SZ 35 ST CURAPLEX CURAVIEW LED

## (undated) DEVICE — CURAVIEW LED LARYNSCP BLDE

## (undated) DEVICE — SOL IRR NACL 0.9PCT BT 1000ML

## (undated) DEVICE — MASTISOL ADHESIVE LIQ 2/3ML

## (undated) DEVICE — NERVE BLOCK TRAY (FACET)-LF: Brand: MEDLINE INDUSTRIES, INC.

## (undated) DEVICE — SUTURE VCRL SZ 1 L27IN ABSRB UD L36MM CP-1 1/2 CIR REV CUT J268H

## (undated) DEVICE — MODEL AT P65, P/N 701554-001KIT CONTENTS: HAND CONTROLLER, 3-WAY HIGH-PRESSURE STOPCOCK WITH ROTATING END AND PREMIUM HIGH-PRESSURE TUBING: Brand: ANGIOTOUCH® KIT

## (undated) DEVICE — SOLUTION IV 100ML 0.9% SOD CHL PLAS CONT USP VIAFLX 1 PER

## (undated) DEVICE — HYPODERMIC SAFETY NEEDLE: Brand: MAGELLAN

## (undated) DEVICE — SOLUTION IV 250ML 0.9% SOD CHL PH 5 INJ USP VIAFLX PLAS

## (undated) DEVICE — KIT MON DETACH MPLR PRB EXTN CBL BALL TIP PRB DISP

## (undated) DEVICE — PK CATH CARD 30 CA/4

## (undated) DEVICE — ENCORE® LATEX MICRO SIZE 7.5, STERILE LATEX POWDER-FREE SURGICAL GLOVE: Brand: ENCORE

## (undated) DEVICE — GUIDEWIRE TRCR TIP NIT MIS
Type: IMPLANTABLE DEVICE | Site: BACK | Status: NON-FUNCTIONAL
Removed: 2018-03-13

## (undated) DEVICE — SOLUTION IV IRRIG POUR BRL 0.9% SODIUM CHL 2F7124

## (undated) DEVICE — GLOVE SURG SZ 75 L12IN FNGR THK87MIL DK GRN LTX FREE ISOLEX